# Patient Record
Sex: MALE | Race: WHITE | NOT HISPANIC OR LATINO | Employment: OTHER | URBAN - METROPOLITAN AREA
[De-identification: names, ages, dates, MRNs, and addresses within clinical notes are randomized per-mention and may not be internally consistent; named-entity substitution may affect disease eponyms.]

---

## 2018-10-08 NOTE — PROGRESS NOTES
Date of Office Visit: 10/09/2018  Encounter Provider: FRANSISCO Kinsey  Place of Service: [facility] CARDIOLOGY  Patient Name: Sharif Mea  :1945      Chief Complaint   Patient presents with   • Coronary Artery Disease     Follow Up    :     Dear Dr. Donahue,     HPI: Sharif Mae is a pleasant 73 y.o. male who presents today for cardiac follow up. He is a new patient to me and his previous records have been reviewed.  He has a history of hypertension, hyperlipidemia, obstructive sleep apnea (compliant with CPAP machine), previous stroke, and abnormal EKG with right bundle-branch block.    He has a history of coronary artery disease and status post coronary artery bypass graft surgery 2004.  He is an established patient of Dr. Breana Noe.  He had a cardiac PET scan in 2015 which showed no evidence of ischemia and an echocardiogram which revealed an EF of 57%, grade 2 diastolic dysfunction, mild left ventricular hypertrophy, moderate right ventricular dilation, moderate left atrial dilation, and mild tricuspid insufficiency.  He was last seen by Dr. Noe in 2016.  He did not come for his annual follow-up in .    He presents today for follow-up at the request of Dr. Charlotte Donahue.  He is still working part-time as a CPA and remains fairly active playing golf.  He denies chest pain, shortness of air, PND, orthopnea, cough, edema, palpitations, dizziness, or syncope.  His blood pressure is borderline elevated today in the office.  I reviewed his blood work from his PCP office dated 8/15/18 which included a CMP which was normal, CBC normal hemoglobin and hematocrit, LDL 60, HDL 35, triglycerides 199.    Past Medical History:   Diagnosis Date   • CAD (coronary artery disease)     s/p CABG   • H/O echocardiogram    • Health care maintenance    • History of EKG 2015   • Hx of CABG    • Hyperlipidemia    • Hypertension    •  Lumbar canal stenosis    • KLEBER on CPAP    • RBBB (right bundle branch block)    • Spondylisthesis    • Squamous cell carcinoma of skin    • Stroke syndrome        Past Surgical History:   Procedure Laterality Date   • CARDIAC CATHETERIZATION  12/24/2004 12/2004; Showed 90% stenosis in the OM-1, 50% stenosis in the distal left circumflex, 70% stenosis in the Ramus, and 40-50% stenosis in the ditsal RCA.   • CORONARY ARTERY BYPASS GRAFT  12/2004    LIMA to the LAD, KITTY to the second obtuse marginal, saphenous vein graft to the ramus, and saphenous vein graft to the first obtuse marginal    • KNEE SURGERY Right 09/14/2014       Social History     Social History   • Marital status:      Spouse name: N/A   • Number of children: N/A   • Years of education: N/A     Occupational History   • Not on file.     Social History Main Topics   • Smoking status: Former Smoker     Types: Pipe   • Smokeless tobacco: Not on file      Comment: quit 20 years ago  //  caffeine use   • Alcohol use Yes      Comment: caffeine use   • Drug use: Unknown   • Sexual activity: Not on file       Family History   Problem Relation Age of Onset   • Heart disease Mother    • Heart disease Father    • Hypertension Father    • Stroke Father        The following portion of the patient's history were reviewed and updated as appropriate: past medical history, past surgical history, past social history, past family history, allergies, current medications, and problem list.    Review of Systems   Constitution: Negative for chills, diaphoresis, fever, malaise/fatigue, night sweats, weight gain and weight loss.   HENT: Negative for hearing loss, nosebleeds, sore throat and tinnitus.    Eyes: Negative for blurred vision, double vision, pain and visual disturbance.   Cardiovascular: Negative for chest pain, claudication, cyanosis, dyspnea on exertion, irregular heartbeat, leg swelling, near-syncope, orthopnea, palpitations, paroxysmal nocturnal  "dyspnea and syncope.   Respiratory: Negative for cough, hemoptysis, shortness of breath, snoring and wheezing.    Endocrine: Negative for cold intolerance, heat intolerance and polyuria.   Hematologic/Lymphatic: Negative for bleeding problem. Does not bruise/bleed easily.   Skin: Negative for color change, dry skin, flushing and itching.   Musculoskeletal: Negative for falls, joint pain, joint swelling, muscle cramps, muscle weakness and myalgias.   Gastrointestinal: Negative for abdominal pain, constipation, heartburn, melena, nausea and vomiting.   Genitourinary: Negative for dysuria and hematuria.   Neurological: Negative for excessive daytime sleepiness, dizziness, light-headedness, loss of balance, numbness, paresthesias, seizures and vertigo.   Psychiatric/Behavioral: Negative for altered mental status, depression, memory loss and substance abuse. The patient does not have insomnia and is not nervous/anxious.    Allergic/Immunologic: Negative for environmental allergies.       No Known Allergies      Current Outpatient Prescriptions:   •  amLODIPine (NORVASC) 5 MG tablet, Take 1 tablet by mouth Daily. For blood pressure, Disp: , Rfl:   •  aspirin 81 MG tablet, Take 1 tablet by mouth Daily., Disp: 30 tablet, Rfl: 11  •  CIALIS 5 MG tablet, Take 1 tablet by mouth Daily As Needed., Disp: , Rfl: 1  •  Glucosamine HCl (GLUCOSAMINE PO), Take  by mouth., Disp: , Rfl:   •  rosuvastatin (CRESTOR) 10 MG tablet, Take 1 tablet by mouth Every Night. At bedtime, Disp: , Rfl:   •  Testosterone (AXIRON) 30 MG/ACT solution, Place  on the skin., Disp: , Rfl:   •  Multiple Vitamin tablet, Take  by mouth., Disp: , Rfl:   •  Vitamin E 400 UNITS tablet, Take  by mouth., Disp: , Rfl:         Objective:     Vitals:    10/09/18 1101   BP: 140/80   Pulse: 73   Weight: 107 kg (235 lb 3.2 oz)   Height: 167.6 cm (66\")     Body mass index is 37.96 kg/m².    PHYSICAL EXAM:    Vitals Reviewed.   General Appearance: No acute distress, well " developed and well nourished. Obese.   Eyes: Conjunctiva and lids: No erythema, swelling, or discharge. Sclera non-icteric.   HENT: Atraumatic, normocephalic. External eyes, ears, and nose normal. No hearing loss noted. Mucous membranes normal. Lips not cyanotic. Neck supple with no tenderness.  Respiratory: No signs of respiratory distress. Respiration rhythm and depth normal.   Clear to auscultation. No rales, crackles, rhonchi, or wheezing auscultated.   Cardiovascular:  Jugular Venous Pressure: Normal  Heart Rate and Rhythm: Normal, Heart Sounds: Normal S1 and S2. No S3 or S4 noted.  Murmurs: No murmurs noted. No rubs, thrills, or gallops.   Arterial Pulses: Carotid pulses normal. No carotid bruit noted. Posterior tibialis and dorsalis pedis pulses normal.   Lower Extremities: No edema noted.  Gastrointestinal:  Abdomen soft, non-distended, non-tender. Normal bowel sounds. No hepatomegaly.   Musculoskeletal: Normal movement of extremities  Skin and Nails: General appearance normal. No pallor, cyanosis, diaphoresis. Skin temperature normal. No clubbing of fingernails.   Psychiatric: Patient alert and oriented to person, place, and time. Speech and behavior appropriate. Normal mood and affect.       ECG 12 Lead  Date/Time: 10/9/2018 10:57 AM  Performed by: BARBARA DUMONT  Authorized by: BARBARA DUMONT   Comparison: compared with previous ECG from 12/5/2016  Rhythm: sinus rhythm  Rate: normal  BPM: 73  Conduction: right bundle branch block  ST Segments: ST segments normal  T Waves: T waves normal  QRS axis: right  Other findings: PRWP  Q waves: V1 and V2  Clinical impression: abnormal ECG              Assessment:       Diagnosis Plan   1. Coronary artery disease involving native coronary artery of native heart without angina pectoris     2. Essential hypertension     3. Hyperlipidemia, unspecified hyperlipidemia type     4. RBBB (right bundle branch block)            Plan:       1.  Coronary Artery Disease: He  denies anginal symptoms and EKG tracing is unchanged.  We discussed risk factor modification including good blood pressure control, cholesterol, and regular exercise.  He remains on good medications for secondary prevention including aspirin, amlodipine, and rosuvastatin.  Last cardiac patent stress test in 2015 showed no evidence of ischemia.    Coronary Artery Disease  Assessment  • The patient has no angina    Plan  • Lifestyle modifications discussed include adhering to a heart healthy diet, maintenance of a healthy weight, medication compliance, regular exercise and regular monitoring of cholesterol and blood pressure    Subjective - Objective  • There is a history of previous coronary artery bypass graft  • Current antiplatelet therapy includes aspirin 81 mg      2.  Hypertension: Blood pressure is borderline elevated today.  He will need to continue to monitor.    3.  Hyperlipidemia: LDL within normal limits, continue rosuvastatin.    4.  Right Bundle Branch Block: Chronic and unchanged.    5.  Obesity: BMI is 38.  He would benefit from weight loss and regular exercise.    6.  Follow-up with Dr. Breana Noe in one year, unless otherwise needed sooner.    As always, it has been a pleasure to participate in your patient's care. Thank you.       Sincerely,         FRANSISCO Garcia

## 2018-10-09 ENCOUNTER — OFFICE VISIT (OUTPATIENT)
Dept: CARDIOLOGY | Facility: CLINIC | Age: 73
End: 2018-10-09

## 2018-10-09 VITALS
DIASTOLIC BLOOD PRESSURE: 80 MMHG | WEIGHT: 235.2 LBS | SYSTOLIC BLOOD PRESSURE: 140 MMHG | HEIGHT: 66 IN | BODY MASS INDEX: 37.8 KG/M2 | HEART RATE: 73 BPM

## 2018-10-09 DIAGNOSIS — E78.5 HYPERLIPIDEMIA, UNSPECIFIED HYPERLIPIDEMIA TYPE: ICD-10-CM

## 2018-10-09 DIAGNOSIS — I25.10 CORONARY ARTERY DISEASE INVOLVING NATIVE CORONARY ARTERY OF NATIVE HEART WITHOUT ANGINA PECTORIS: Primary | ICD-10-CM

## 2018-10-09 DIAGNOSIS — I45.10 RBBB (RIGHT BUNDLE BRANCH BLOCK): ICD-10-CM

## 2018-10-09 DIAGNOSIS — I10 ESSENTIAL HYPERTENSION: ICD-10-CM

## 2018-10-09 PROCEDURE — 93000 ELECTROCARDIOGRAM COMPLETE: CPT | Performed by: NURSE PRACTITIONER

## 2018-10-09 PROCEDURE — 99214 OFFICE O/P EST MOD 30 MIN: CPT | Performed by: NURSE PRACTITIONER

## 2018-10-23 ENCOUNTER — APPOINTMENT (OUTPATIENT)
Dept: SLEEP MEDICINE | Facility: HOSPITAL | Age: 73
End: 2018-10-23
Attending: INTERNAL MEDICINE

## 2019-02-28 ENCOUNTER — AMBULATORY SURGICAL CENTER (OUTPATIENT)
Dept: URBAN - METROPOLITAN AREA SURGERY 20 | Facility: SURGERY | Age: 74
End: 2019-02-28
Payer: COMMERCIAL

## 2019-02-28 ENCOUNTER — OFFICE (OUTPATIENT)
Dept: URBAN - METROPOLITAN AREA PATHOLOGY 4 | Facility: PATHOLOGY | Age: 74
End: 2019-02-28
Payer: COMMERCIAL

## 2019-02-28 DIAGNOSIS — K64.1 SECOND DEGREE HEMORRHOIDS: ICD-10-CM

## 2019-02-28 DIAGNOSIS — Z80.0 FAMILY HISTORY OF MALIGNANT NEOPLASM OF DIGESTIVE ORGANS: ICD-10-CM

## 2019-02-28 DIAGNOSIS — K57.30 DIVERTICULOSIS OF LARGE INTESTINE WITHOUT PERFORATION OR ABS: ICD-10-CM

## 2019-02-28 DIAGNOSIS — D12.3 BENIGN NEOPLASM OF TRANSVERSE COLON: ICD-10-CM

## 2019-02-28 PROBLEM — Z80.9 FAMILY HISTORY OF COLON CANCER: Status: ACTIVE | Noted: 2019-02-28

## 2019-02-28 LAB
GI HISTOLOGY: A. SELECT: (no result)
GI HISTOLOGY: PDF REPORT: (no result)

## 2019-02-28 PROCEDURE — 45380 COLONOSCOPY AND BIOPSY: CPT | Mod: PT | Performed by: INTERNAL MEDICINE

## 2019-02-28 PROCEDURE — 88305 TISSUE EXAM BY PATHOLOGIST: CPT | Mod: 33 | Performed by: INTERNAL MEDICINE

## 2019-06-17 ENCOUNTER — OFFICE VISIT (OUTPATIENT)
Dept: SLEEP MEDICINE | Facility: HOSPITAL | Age: 74
End: 2019-06-17

## 2019-06-17 VITALS
DIASTOLIC BLOOD PRESSURE: 67 MMHG | BODY MASS INDEX: 39.41 KG/M2 | HEART RATE: 60 BPM | OXYGEN SATURATION: 95 % | WEIGHT: 245.2 LBS | SYSTOLIC BLOOD PRESSURE: 125 MMHG | HEIGHT: 66 IN

## 2019-06-17 DIAGNOSIS — G47.33 OSA TREATED WITH BIPAP: Primary | ICD-10-CM

## 2019-06-17 DIAGNOSIS — E66.9 OBESITY (BMI 30-39.9): ICD-10-CM

## 2019-06-17 DIAGNOSIS — G47.31 COMPLEX SLEEP APNEA SYNDROME: ICD-10-CM

## 2019-06-17 PROCEDURE — G0463 HOSPITAL OUTPT CLINIC VISIT: HCPCS

## 2019-06-17 RX ORDER — ATORVASTATIN CALCIUM 40 MG/1
40 TABLET, FILM COATED ORAL DAILY
COMMUNITY
End: 2020-02-18

## 2019-06-17 NOTE — PROGRESS NOTES
[facility]  Telephone: [phone] / Fax: [phone] [place]  Telephone: [phone] / Fax: [phone] Mery Bhatt    Referring Physician: Charlotte Donahue MD  PCP: Charlotte Donahue MD    Reason for consult:  sleep apnea    Sharif Mae is a 73 y.o.male  was seen in the [facility] today for re-evaluation of sleep apnea. He was diagnosed with KLEBER at least 7 years ago. He is now on his second machine which he got from [email or web address].  He does not recall where was his original sleep study done but [facility] may have a copy of it. He is here today per request of Dr. Cardona to make sure his KLEBER is controlled well.  He is not on a modem. He did not bring his smart card for review.    According to his wife he does not snore on the machine and has no witnessed apneas or gasping respirations. He feels that his level of alertness during the day has improved with CPAP device and so is the sleep quality. He wakes up feeling more rested in the morning.  His sleep schedule is 11pm-8am.    SH- retired, 4 oz of alcohol, 3 coffee per day, no drugs.    ROS- negative    Sharif Mae  has a past medical history of CAD (coronary artery disease), H/O echocardiogram, Health care maintenance, History of EKG (07/21/2015), CABG, Hyperlipidemia, Hypertension, Lumbar canal stenosis, KLEBER on CPAP, RBBB (right bundle branch block), Spondylisthesis, Squamous cell carcinoma of skin, and Stroke syndrome.    Current Medications:    Current Outpatient Medications:   •  amLODIPine (NORVASC) 5 MG tablet, Take 1 tablet by mouth Daily. For blood pressure, Disp: , Rfl:   •  atorvastatin (LIPITOR) 40 MG tablet, Take 40 mg by mouth Daily., Disp: , Rfl:   •  CIALIS 5 MG tablet, Take 1 tablet by mouth Daily As Needed., Disp: , Rfl: 1  •  Glucosamine HCl (GLUCOSAMINE PO), Take  by mouth., Disp: , Rfl:   •  L-Methylfolate-R15-K6-K2 (CEREFOLIN PO), Take  by mouth., Disp: , Rfl:   •  Memantine HCl-Donepezil HCl  "(NAMZARIC) 14-10 MG capsule sustained-release 24 hr, Take  by mouth., Disp: , Rfl:   •  Multiple Vitamin tablet, Take  by mouth., Disp: , Rfl:   •  rosuvastatin (CRESTOR) 10 MG tablet, Take 1 tablet by mouth Every Night. At bedtime, Disp: , Rfl:   •  Testosterone (AXIRON) 30 MG/ACT solution, Place  on the skin., Disp: , Rfl:   •  aspirin 81 MG tablet, Take 1 tablet by mouth Daily., Disp: 30 tablet, Rfl: 11  •  Vitamin E 400 UNITS tablet, Take  by mouth., Disp: , Rfl:     I have reviewed Past Medical History, Past Surgical History, Medication List, Social History and Family History as entered in Sleep Questionnaire and EPIC.    ESS  0   Vital Signs /67 (BP Location: Left arm, Patient Position: Sitting)   Pulse 60   Ht 167.6 cm (66\")   Wt 111 kg (245 lb 3.2 oz)   SpO2 95%   BMI 39.58 kg/m²  Body mass index is 39.58 kg/m².    General Alert and oriented. No acute distress noted   Pharynx/Throat Class IV Mallampati airway, large tongue, no evidence of redundant lateral pharyngeal tissue. No oral lesions. No thrush. Moist mucous membranes.   Head Normocephalic. Symmetrical. Atraumatic.    Nose No septal deviation. No drainage   Chest Wall Normal shape. Symmetric expansion with respiration. No tenderness.   Neck Trachea midline, no thyromegaly or adenopathy    Lungs Clear to auscultation bilaterally. No wheezes. No rhonchi. No rales. Respirations regular, even and unlabored.   Heart Regular rhythm and normal rate. Normal S1 and S2. No murmur   Abdomen Soft, non-tender and non-distended. Normal bowel sounds. No masses.   Extremities Moves all extremities well. No edema   Psychiatric Normal mood and affect.       Testing: sleep study report is unavailable.              Impression:  1. KLEBER treated with BiPAP    2. Complex sleep apnea syndrome    3. Obesity (BMI 30-39.9)          Plan:  Get copy of sleep study from [employer] [employer]. I asked patient to bring his smart card here for review. If any changes need to be made, I " will make them after reviewing his smart card.  If he still has apneas despite changes in settings, consider titration study in the future.    I discussed the pathophysiology of obstructive sleep apnea with the patient.  We discussed the adverse outcomes associated with untreated sleep-disordered breathing.  .  Weight loss will be strongly beneficial in order to reduce the severity of sleep-disordered breathing.  Patient has narrow oropharyngeal structure.  Caution during activities that require prolonged concentration is strongly advised. Thank you for allowing me to participate in your patient's  care.      I carefully reviewed download dates 5/22/19-6/20/19- 100% use is noted with average nightly use of 8 hours and 32 min. Avg EPAP is 8.8, avg PS 6.7, avg AHI is 3.6.  His current settings are maximum pressure of 25, minimum EPAP of 8, maximum EPAP 15, minimum pressure support 4, maximum pressure support 17.  His complex sleep apnea is very well treated.  He is residual AHI is within excellent range.  There appears to be a large air leak present which requires replacement of mask cushion.  Patient will be notified and will be asked to contact durable medical equipment company to replace.  His residual AHI is not affected by the leak, which is reassuring.    I received copy of his original sleep study from [facility] and reviewed. 2014 PSG showed overall AHI 76(20 episodes of mixed sleep apnea, 6 episodes of central sleep apnea, 46 episodes of obstructive sleep apnea). Titration in 2015 recommended IPAP max 25, max EPAP 15, min EPAP 8, PS 4-17, automatic rate.      RTC in 1 year to see Dr. Suarez.    FRANSISCO Abdalla  [facility]  Phone: [phone]      Part of this note may be an electronic transcription/translation of spoken language to printed text using the Dragon Dictation System. Some errors may exist even though the document was edited.

## 2019-06-24 ENCOUNTER — TELEPHONE (OUTPATIENT)
Dept: SLEEP MEDICINE | Facility: HOSPITAL | Age: 74
End: 2019-06-24

## 2019-06-24 NOTE — TELEPHONE ENCOUNTER
----- Message from Neri Franks sent at 6/24/2019 12:31 PM EDT -----  Lm for pt and called for copy of study. Study received.  ----- Message -----  From: Shannon Kerns APRN  Sent: 6/24/2019   9:50 AM  To: Neri Franks please let patient know I reviewed his smart card data he brought over. His sleep apnea is very well controlled at this time which is excellent. His mask started leaking. The cushion needs to be replaced monthly to prevent it. If he has any issues, ask him to call us. Otherwise, ask him to f/u with Dr. Suarez here next year as scheduled.    Please ask [facility] to send us his sleep study report.    Thanks.

## 2020-02-18 ENCOUNTER — TELEPHONE (OUTPATIENT)
Dept: CARDIOLOGY | Facility: CLINIC | Age: 75
End: 2020-02-18

## 2020-02-18 ENCOUNTER — OFFICE VISIT (OUTPATIENT)
Dept: CARDIOLOGY | Facility: CLINIC | Age: 75
End: 2020-02-18

## 2020-02-18 VITALS
SYSTOLIC BLOOD PRESSURE: 132 MMHG | WEIGHT: 260.6 LBS | HEIGHT: 66 IN | DIASTOLIC BLOOD PRESSURE: 66 MMHG | BODY MASS INDEX: 41.88 KG/M2 | HEART RATE: 65 BPM

## 2020-02-18 DIAGNOSIS — I10 ESSENTIAL HYPERTENSION: ICD-10-CM

## 2020-02-18 DIAGNOSIS — I45.10 RBBB (RIGHT BUNDLE BRANCH BLOCK): ICD-10-CM

## 2020-02-18 DIAGNOSIS — E78.5 HYPERLIPIDEMIA, UNSPECIFIED HYPERLIPIDEMIA TYPE: ICD-10-CM

## 2020-02-18 DIAGNOSIS — I25.10 CORONARY ARTERY DISEASE INVOLVING NATIVE CORONARY ARTERY OF NATIVE HEART WITHOUT ANGINA PECTORIS: Primary | ICD-10-CM

## 2020-02-18 PROCEDURE — 93000 ELECTROCARDIOGRAM COMPLETE: CPT | Performed by: INTERNAL MEDICINE

## 2020-02-18 PROCEDURE — 99214 OFFICE O/P EST MOD 30 MIN: CPT | Performed by: INTERNAL MEDICINE

## 2020-02-18 RX ORDER — ATORVASTATIN CALCIUM 80 MG/1
80 TABLET, FILM COATED ORAL NIGHTLY
Qty: 90 TABLET | Refills: 3 | Status: SHIPPED | COMMUNITY
Start: 2020-02-18 | End: 2020-02-24 | Stop reason: SDUPTHER

## 2020-02-18 NOTE — PROGRESS NOTES
Date of Office Visit: 2020  Encounter Provider: Breana Noe MD  Place of Service: [facility] CARDIOLOGY  Patient Name: Sharif Mae  :1945      Patient ID:  Sharif Mae is a 74 y.o. male is here for  followup for CAD, s/p CABG.         History of Present Illness    In , he had an abnormal stress test, which was followed up with a cardiac  catheterization. This revealed multivessel coronary artery disease. He underwent coronary  artery bypass grafting in 2004 with a LIMA to the LAD, KITTY to the second obtuse  marginal, saphenous vein graft to the ramus, and saphenous vein graft to the first obtuse  marginal. His ejection fraction was normal at that time.      He saw me in 2013 and was noted to have an abnormal EKG with anterior Q  waves. We set him up for an echocardiogram, which revealed normal left ventricular size  and systolic function with normal wall motion. He then had a plain treadmill stress test.  He was able to exercise for 9 minutes of a Napoleon protocol, reaching a peak heart rate of  86% of his age predicted maximum. There were no EKG changes, and there was no chest  discomfort, although he did become quite wheezy and hypoxic with an oxygen saturation of  86%.      He was diagnosed severe obstructive  sleep apnea in 2015 and now he uses a CPAP machine and does feel better with that.      When I saw him in  he was very sedentary because he had had a lot of back issues and was getting epidural injections for his back.  He was also having some dyspnea on exertion which he thought might be due to deconditioning but given his history, I did send him for a 2-D echocardiogram with Doppler which was performed on 2015 which revealed an ejection fraction of 57% with grade II diastolic dysfunction, moderate right ventricular dilation, moderate left atrial dilation, mild tricuspid insufficiency, and otherwise normal valvular  structure and function. There was mild left ventricular hypertrophy.  He also had a stress PET study performed on 08/03/2015 which showed no evidence of ischemia.       He has no chest pain or pressure.  He has no orthopnea or PND.  He has no exertional dyspnea.  He has had no palpitations, dizziness or syncope.  He is not exercising.  He needs to be exercising.  He has no difficulty with his medications.  He did have labs done with Dr. Donahue.  His energy level is somewhat low at times but he thinks is because the weather not exercising.    Labs on 5/29/2019 show glucose 108, otherwise normal CMP, normal CBC, LDL not done because triglycerides 482.  HDL was 40.  Hemoglobin A1c 5.3, normal TSH.    Past Medical History:   Diagnosis Date   • CAD (coronary artery disease)     s/p CABG   • H/O echocardiogram    • Health care maintenance    • History of EKG 07/21/2015   • Hx of CABG    • Hyperlipidemia    • Hypertension    • Lumbar canal stenosis    • KLEBER on CPAP    • RBBB (right bundle branch block)    • Spondylisthesis    • Squamous cell carcinoma of skin    • Stroke syndrome          Past Surgical History:   Procedure Laterality Date   • CARDIAC CATHETERIZATION  12/24/2004 12/2004; Showed 90% stenosis in the OM-1, 50% stenosis in the distal left circumflex, 70% stenosis in the Ramus, and 40-50% stenosis in the ditsal RCA.   • CORONARY ARTERY BYPASS GRAFT  12/2004    LIMA to the LAD, KITTY to the second obtuse marginal, saphenous vein graft to the ramus, and saphenous vein graft to the first obtuse marginal    • KNEE SURGERY Right 09/14/2014       Current Outpatient Medications on File Prior to Visit   Medication Sig Dispense Refill   • amLODIPine (NORVASC) 5 MG tablet Take 1 tablet by mouth Daily. For blood pressure     • atorvastatin (LIPITOR) 40 MG tablet Take 40 mg by mouth Daily.     • CIALIS 5 MG tablet Take 1 tablet by mouth Daily As Needed.  1   • Glucosamine HCl (GLUCOSAMINE PO) Take  by mouth.     •  L-Methylfolate-X96-[id number] (CEREFOLIN PO) Take  by mouth.     • Memantine HCl-Donepezil HCl (NAMZARIC) 14-10 MG capsule sustained-release 24 hr Take  by mouth Daily.     • Testosterone (AXIRON) 30 MG/ACT solution Place  on the skin.     • Vitamin E 400 UNITS tablet Take  by mouth.     • [DISCONTINUED] aspirin 81 MG tablet Take 1 tablet by mouth Daily. 30 tablet 11   • [DISCONTINUED] Multiple Vitamin tablet Take  by mouth.     • [DISCONTINUED] rosuvastatin (CRESTOR) 10 MG tablet Take 1 tablet by mouth Every Night. At bedtime       No current facility-administered medications on file prior to visit.        Social History     Socioeconomic History   • Marital status:      Spouse name: Not on file   • Number of children: Not on file   • Years of education: Not on file   • Highest education level: Not on file   Tobacco Use   • Smoking status: Former Smoker     Types: Pipe     Last attempt to quit: 1987     Years since quittin.0   • Smokeless tobacco: Never Used   Substance and Sexual Activity   • Alcohol use: Yes     Alcohol/week: 2.0 standard drinks     Types: 2 Shots of liquor per week     Drinks per session: 1 or 2     Binge frequency: Daily or almost daily     Comment: caffeine use: 3  cups daily.            Review of Systems   Constitution: Negative.   HENT: Negative for congestion.    Eyes: Negative for vision loss in left eye and vision loss in right eye.   Respiratory: Negative.  Negative for cough, hemoptysis, shortness of breath, sleep disturbances due to breathing, snoring, sputum production and wheezing.    Endocrine: Negative.    Hematologic/Lymphatic: Negative.    Skin: Negative for poor wound healing and rash.   Musculoskeletal: Negative for falls, gout, muscle cramps and myalgias.   Gastrointestinal: Negative for abdominal pain, diarrhea, dysphagia, hematemesis, melena, nausea and vomiting.   Neurological: Negative for excessive daytime sleepiness, dizziness, headaches, light-headedness,  "loss of balance, seizures and vertigo.   Psychiatric/Behavioral: Negative for depression and substance abuse. The patient is not nervous/anxious.        Procedures    ECG 12 Lead  Date/Time: 2/18/2020 11:58 AM  Performed by: Breana Noe MD  Authorized by: Breana Noe MD   Comparison: compared with previous ECG   Similar to previous ECG  Rhythm: sinus rhythm  Conduction: incomplete right bundle branch block    Clinical impression: non-specific ECG                Objective:      Vitals:    02/18/20 1141   BP: 132/66   BP Location: Left arm   Pulse: 65   Weight: 118 kg (260 lb 9.6 oz)   Height: 167.6 cm (66\")     Body mass index is 42.06 kg/m².    Physical Exam   Constitutional: He is oriented to person, place, and time. He appears well-developed and well-nourished. No distress.   HENT:   Head: Normocephalic and atraumatic.   Eyes: Conjunctivae are normal. No scleral icterus.   Neck: Neck supple. No JVD present. Carotid bruit is not present. No thyromegaly present.   Cardiovascular: Normal rate, regular rhythm, S1 normal, S2 normal and intact distal pulses.  No extrasystoles are present. PMI is not displaced. Exam reveals no gallop.   No murmur heard.  Pulses:       Carotid pulses are 2+ on the right side, and 2+ on the left side.       Radial pulses are 2+ on the right side, and 2+ on the left side.        Dorsalis pedis pulses are 2+ on the right side, and 2+ on the left side.        Posterior tibial pulses are 2+ on the right side, and 2+ on the left side.   Pulmonary/Chest: Effort normal and breath sounds normal. No respiratory distress. He has no wheezes. He has no rhonchi. He has no rales. He exhibits no tenderness.   Abdominal: Soft. Bowel sounds are normal. He exhibits no distension, no abdominal bruit and no mass. There is no tenderness.   Musculoskeletal: He exhibits no edema or deformity.   Lymphadenopathy:     He has no cervical adenopathy.   Neurological: He is alert and oriented to " person, place, and time. No cranial nerve deficit.   Skin: Skin is warm and dry. No rash noted. He is not diaphoretic. No cyanosis. No pallor. Nails show no clubbing.   Psychiatric: He has a normal mood and affect. Judgment normal.   Vitals reviewed.      Lab Review:       Assessment:      Diagnosis Plan   1. Coronary artery disease involving native coronary artery of native heart without angina pectoris     2. Hyperlipidemia, unspecified hyperlipidemia type     3. Essential hypertension     4. RBBB (right bundle branch block)       1. CAD, s/p CABG 2004, Normal PET stress study and a 2-D echocardiogram with Doppler with EF 57% and grade 2 diastolic dysfunction in 2015.    2. Obstructive sleep apnea. Treated with CPAP.   3. Hypertension, well controlled. Goal <120/80.    4. Hyperlipidemia, on atorvastatin.   5. History of stroke after taking Viagra.   6. Family history of cardiovascular disease.   7. Morbid obesity.   8. Sedentary lifestyle. needs ti exercise.   9. Chronic back pain, receiving epidural therapy.          Plan:       See damian in 1 year, get labs from Dr. Donahue.

## 2020-02-19 ENCOUNTER — TELEPHONE (OUTPATIENT)
Dept: CARDIOLOGY | Facility: CLINIC | Age: 75
End: 2020-02-19

## 2020-02-19 NOTE — TELEPHONE ENCOUNTER
----- Message from Breana Noe MD sent at 2/18/2020 12:37 PM EST -----    pls let him know that I received his labs and his cholesterol is too high, increase atorvastatin to 80mg nightly, I sent to his pharmacy.     rm

## 2020-02-24 RX ORDER — ATORVASTATIN CALCIUM 80 MG/1
80 TABLET, FILM COATED ORAL NIGHTLY
Qty: 90 TABLET | Refills: 1 | Status: ON HOLD | OUTPATIENT
Start: 2020-02-24 | End: 2023-03-14

## 2020-06-12 ENCOUNTER — APPOINTMENT (OUTPATIENT)
Dept: SLEEP MEDICINE | Facility: HOSPITAL | Age: 75
End: 2020-06-12

## 2021-02-18 ENCOUNTER — TELEPHONE (OUTPATIENT)
Dept: CARDIOLOGY | Facility: CLINIC | Age: 76
End: 2021-02-18

## 2021-03-15 ENCOUNTER — BULK ORDERING (OUTPATIENT)
Dept: CASE MANAGEMENT | Facility: OTHER | Age: 76
End: 2021-03-15

## 2021-03-15 DIAGNOSIS — Z23 IMMUNIZATION DUE: ICD-10-CM

## 2021-08-10 ENCOUNTER — OFFICE VISIT (OUTPATIENT)
Dept: CARDIOLOGY | Facility: CLINIC | Age: 76
End: 2021-08-10

## 2021-08-10 ENCOUNTER — LAB (OUTPATIENT)
Dept: LAB | Facility: HOSPITAL | Age: 76
End: 2021-08-10

## 2021-08-10 VITALS
HEIGHT: 66 IN | RESPIRATION RATE: 16 BRPM | HEART RATE: 66 BPM | WEIGHT: 246 LBS | OXYGEN SATURATION: 98 % | SYSTOLIC BLOOD PRESSURE: 124 MMHG | BODY MASS INDEX: 39.53 KG/M2 | DIASTOLIC BLOOD PRESSURE: 80 MMHG

## 2021-08-10 DIAGNOSIS — I25.10 CORONARY ARTERY DISEASE INVOLVING NATIVE CORONARY ARTERY OF NATIVE HEART WITHOUT ANGINA PECTORIS: Primary | ICD-10-CM

## 2021-08-10 DIAGNOSIS — I45.10 RBBB (RIGHT BUNDLE BRANCH BLOCK): ICD-10-CM

## 2021-08-10 DIAGNOSIS — I25.10 CORONARY ARTERY DISEASE INVOLVING NATIVE CORONARY ARTERY OF NATIVE HEART WITHOUT ANGINA PECTORIS: ICD-10-CM

## 2021-08-10 DIAGNOSIS — E78.5 HYPERLIPIDEMIA, UNSPECIFIED HYPERLIPIDEMIA TYPE: ICD-10-CM

## 2021-08-10 DIAGNOSIS — I10 ESSENTIAL HYPERTENSION: ICD-10-CM

## 2021-08-10 LAB
ALBUMIN SERPL-MCNC: 4.3 G/DL (ref 3.5–5.2)
ALBUMIN/GLOB SERPL: 1.4 G/DL
ALP SERPL-CCNC: 90 U/L (ref 39–117)
ALT SERPL W P-5'-P-CCNC: 37 U/L (ref 1–41)
ANION GAP SERPL CALCULATED.3IONS-SCNC: 10.9 MMOL/L (ref 5–15)
AST SERPL-CCNC: 25 U/L (ref 1–40)
BASOPHILS # BLD AUTO: 0.03 10*3/MM3 (ref 0–0.2)
BASOPHILS NFR BLD AUTO: 0.4 % (ref 0–1.5)
BILIRUB SERPL-MCNC: 0.3 MG/DL (ref 0–1.2)
BUN SERPL-MCNC: 15 MG/DL (ref 8–23)
BUN/CREAT SERPL: 12.2 (ref 7–25)
CALCIUM SPEC-SCNC: 9.3 MG/DL (ref 8.6–10.5)
CHLORIDE SERPL-SCNC: 107 MMOL/L (ref 98–107)
CHOLEST SERPL-MCNC: 165 MG/DL (ref 0–200)
CO2 SERPL-SCNC: 22.1 MMOL/L (ref 22–29)
CREAT SERPL-MCNC: 1.23 MG/DL (ref 0.76–1.27)
DEPRECATED RDW RBC AUTO: 54.2 FL (ref 37–54)
EOSINOPHIL # BLD AUTO: 0.21 10*3/MM3 (ref 0–0.4)
EOSINOPHIL NFR BLD AUTO: 2.9 % (ref 0.3–6.2)
ERYTHROCYTE [DISTWIDTH] IN BLOOD BY AUTOMATED COUNT: 14.9 % (ref 12.3–15.4)
GFR SERPL CREATININE-BSD FRML MDRD: 57 ML/MIN/1.73
GLOBULIN UR ELPH-MCNC: 3 GM/DL
GLUCOSE SERPL-MCNC: 105 MG/DL (ref 65–99)
HCT VFR BLD AUTO: 43.1 % (ref 37.5–51)
HDLC SERPL-MCNC: 43 MG/DL (ref 40–60)
HGB BLD-MCNC: 14.3 G/DL (ref 13–17.7)
IMM GRANULOCYTES # BLD AUTO: 0.01 10*3/MM3 (ref 0–0.05)
IMM GRANULOCYTES NFR BLD AUTO: 0.1 % (ref 0–0.5)
LDLC SERPL CALC-MCNC: 84 MG/DL (ref 0–100)
LDLC/HDLC SERPL: 1.79 {RATIO}
LYMPHOCYTES # BLD AUTO: 2.44 10*3/MM3 (ref 0.7–3.1)
LYMPHOCYTES NFR BLD AUTO: 34 % (ref 19.6–45.3)
MAGNESIUM SERPL-MCNC: 2.3 MG/DL (ref 1.6–2.4)
MCH RBC QN AUTO: 32.7 PG (ref 26.6–33)
MCHC RBC AUTO-ENTMCNC: 33.2 G/DL (ref 31.5–35.7)
MCV RBC AUTO: 98.6 FL (ref 79–97)
MONOCYTES # BLD AUTO: 0.62 10*3/MM3 (ref 0.1–0.9)
MONOCYTES NFR BLD AUTO: 8.6 % (ref 5–12)
NEUTROPHILS NFR BLD AUTO: 3.87 10*3/MM3 (ref 1.7–7)
NEUTROPHILS NFR BLD AUTO: 54 % (ref 42.7–76)
NRBC BLD AUTO-RTO: 0 /100 WBC (ref 0–0.2)
PLATELET # BLD AUTO: 186 10*3/MM3 (ref 140–450)
PMV BLD AUTO: 10.7 FL (ref 6–12)
POTASSIUM SERPL-SCNC: 4 MMOL/L (ref 3.5–5.2)
PROT SERPL-MCNC: 7.3 G/DL (ref 6–8.5)
RBC # BLD AUTO: 4.37 10*6/MM3 (ref 4.14–5.8)
SODIUM SERPL-SCNC: 140 MMOL/L (ref 136–145)
TRIGL SERPL-MCNC: 225 MG/DL (ref 0–150)
TSH SERPL DL<=0.05 MIU/L-ACNC: 1.22 UIU/ML (ref 0.27–4.2)
VLDLC SERPL-MCNC: 38 MG/DL (ref 5–40)
WBC # BLD AUTO: 7.18 10*3/MM3 (ref 3.4–10.8)

## 2021-08-10 PROCEDURE — 83735 ASSAY OF MAGNESIUM: CPT

## 2021-08-10 PROCEDURE — 93000 ELECTROCARDIOGRAM COMPLETE: CPT | Performed by: NURSE PRACTITIONER

## 2021-08-10 PROCEDURE — 85025 COMPLETE CBC W/AUTO DIFF WBC: CPT

## 2021-08-10 PROCEDURE — 80061 LIPID PANEL: CPT

## 2021-08-10 PROCEDURE — 84443 ASSAY THYROID STIM HORMONE: CPT

## 2021-08-10 PROCEDURE — 36415 COLL VENOUS BLD VENIPUNCTURE: CPT

## 2021-08-10 PROCEDURE — 80053 COMPREHEN METABOLIC PANEL: CPT

## 2021-08-10 PROCEDURE — 99214 OFFICE O/P EST MOD 30 MIN: CPT | Performed by: NURSE PRACTITIONER

## 2021-08-11 ENCOUNTER — TELEPHONE (OUTPATIENT)
Dept: CARDIOLOGY | Facility: CLINIC | Age: 76
End: 2021-08-11

## 2021-08-11 NOTE — TELEPHONE ENCOUNTER
----- Message from FRANSISCO Puga sent at 8/10/2021  4:32 PM EDT -----  Please call patientHis triglycerides are elevated.  He needs to avoid alcohol, sugars, carbohydrates and saturated fats.  Otherwise his labs look good.  We will await the results of his echo and stress test.  ----- Message -----  From: Lab, Background User  Sent: 8/10/2021  12:13 PM EDT  To: FRANSISCO Puga

## 2021-08-17 NOTE — TELEPHONE ENCOUNTER
Attempted to contact patient. Voicemail left requesting a call back for the results. Will continue to try and reach patient.      Emmie Chaves RN  Triage [facility]

## 2021-08-18 NOTE — TELEPHONE ENCOUNTER
Final attempt made to call results to this patient.  Voicemail left and we will send a letter requesting a return call for results.    Emmie Chaves RN  Triage [facility]

## 2021-08-23 NOTE — TELEPHONE ENCOUNTER
Patient notified of results and verbalized understanding    Schedulers please call and set up stress test and echo.  Call wife at [phone] to schedule  Thanks  Mónica Mackey RN  Triage nurse

## 2021-09-03 ENCOUNTER — HOSPITAL ENCOUNTER (OUTPATIENT)
Dept: CARDIOLOGY | Facility: HOSPITAL | Age: 76
End: 2021-09-03

## 2021-09-03 ENCOUNTER — APPOINTMENT (OUTPATIENT)
Dept: CARDIOLOGY | Facility: HOSPITAL | Age: 76
End: 2021-09-03

## 2021-10-28 ENCOUNTER — HOSPITAL ENCOUNTER (OUTPATIENT)
Dept: CARDIOLOGY | Facility: HOSPITAL | Age: 76
Discharge: HOME | End: 2021-10-28

## 2021-10-28 VITALS — WEIGHT: 249.12 LBS | BODY MASS INDEX: 39.1 KG/M2 | HEIGHT: 67 IN

## 2021-10-28 VITALS
HEART RATE: 65 BPM | HEIGHT: 66 IN | OXYGEN SATURATION: 95 % | DIASTOLIC BLOOD PRESSURE: 88 MMHG | WEIGHT: 246 LBS | SYSTOLIC BLOOD PRESSURE: 128 MMHG | BODY MASS INDEX: 39.53 KG/M2

## 2021-10-28 DIAGNOSIS — I25.10 CORONARY ARTERY DISEASE INVOLVING NATIVE CORONARY ARTERY OF NATIVE HEART WITHOUT ANGINA PECTORIS: ICD-10-CM

## 2021-10-28 DIAGNOSIS — I10 ESSENTIAL HYPERTENSION: ICD-10-CM

## 2021-10-28 DIAGNOSIS — I45.10 RBBB (RIGHT BUNDLE BRANCH BLOCK): ICD-10-CM

## 2021-10-28 DIAGNOSIS — E78.5 HYPERLIPIDEMIA, UNSPECIFIED HYPERLIPIDEMIA TYPE: ICD-10-CM

## 2021-10-28 LAB
BH CV NUCLEAR PRIOR STUDY: 3
BH CV REST NUCLEAR ISOTOPE DOSE: 11.2 MCI
BH CV STRESS BP STAGE 1: NORMAL
BH CV STRESS COMMENTS STAGE 1: NORMAL
BH CV STRESS DOSE REGADENOSON STAGE 1: 0.4
BH CV STRESS DURATION MIN STAGE 1: 0
BH CV STRESS DURATION SEC STAGE 1: 10
BH CV STRESS HR STAGE 1: 78
BH CV STRESS NUCLEAR ISOTOPE DOSE: 35.4 MCI
BH CV STRESS PROTOCOL 1: NORMAL
BH CV STRESS RECOVERY BP: NORMAL MMHG
BH CV STRESS RECOVERY HR: 67 BPM
BH CV STRESS STAGE 1: 1
LV EF NUC BP: 66 %
MAXIMAL PREDICTED HEART RATE: 142 BPM
PERCENT MAX PREDICTED HR: 54.93 %
STRESS BASELINE BP: NORMAL MMHG
STRESS BASELINE HR: 65 BPM
STRESS PERCENT HR: 65 %
STRESS POST EXERCISE DUR SEC: 10 SEC
STRESS POST PEAK BP: NORMAL MMHG
STRESS POST PEAK HR: 78 BPM
STRESS TARGET HR: 121 BPM

## 2021-10-28 PROCEDURE — 93018 CV STRESS TEST I&R ONLY: CPT | Performed by: INTERNAL MEDICINE

## 2021-10-28 PROCEDURE — 25010000002 REGADENOSON 0.4 MG/5ML SOLUTION: Performed by: NURSE PRACTITIONER

## 2021-10-28 PROCEDURE — 25010000002 PERFLUTREN (DEFINITY) 8.476 MG IN SODIUM CHLORIDE (PF) 0.9 % 10 ML INJECTION: Performed by: NURSE PRACTITIONER

## 2021-10-28 PROCEDURE — 93306 TTE W/DOPPLER COMPLETE: CPT | Performed by: INTERNAL MEDICINE

## 2021-10-28 PROCEDURE — 93016 CV STRESS TEST SUPVJ ONLY: CPT | Performed by: INTERNAL MEDICINE

## 2021-10-28 PROCEDURE — 78452 HT MUSCLE IMAGE SPECT MULT: CPT

## 2021-10-28 PROCEDURE — 93017 CV STRESS TEST TRACING ONLY: CPT

## 2021-10-28 PROCEDURE — 0 TECHNETIUM TETROFOSMIN KIT: Performed by: NURSE PRACTITIONER

## 2021-10-28 PROCEDURE — 93306 TTE W/DOPPLER COMPLETE: CPT

## 2021-10-28 PROCEDURE — 78452 HT MUSCLE IMAGE SPECT MULT: CPT | Performed by: INTERNAL MEDICINE

## 2021-10-28 PROCEDURE — A9502 TC99M TETROFOSMIN: HCPCS | Performed by: NURSE PRACTITIONER

## 2021-10-28 RX ADMIN — TETROFOSMIN 1 DOSE: 1.38 INJECTION, POWDER, LYOPHILIZED, FOR SOLUTION INTRAVENOUS at 12:51

## 2021-10-28 RX ADMIN — TETROFOSMIN 1 DOSE: 1.38 INJECTION, POWDER, LYOPHILIZED, FOR SOLUTION INTRAVENOUS at 13:50

## 2021-10-28 RX ADMIN — REGADENOSON 0.4 MG: 0.08 INJECTION, SOLUTION INTRAVENOUS at 13:50

## 2021-10-28 RX ADMIN — PERFLUTREN 1.5 ML: 6.52 INJECTION, SUSPENSION INTRAVENOUS at 12:48

## 2021-10-29 LAB
AORTIC ARCH: 2.5 CM
ASCENDING AORTA: 3.6 CM
BH CV ECHO MEAS - ACS: 2 CM
BH CV ECHO MEAS - AO MAX PG (FULL): 4.4 MMHG
BH CV ECHO MEAS - AO MAX PG: 8.1 MMHG
BH CV ECHO MEAS - AO MEAN PG (FULL): 1.9 MMHG
BH CV ECHO MEAS - AO MEAN PG: 4.4 MMHG
BH CV ECHO MEAS - AO ROOT AREA (BSA CORRECTED): 1.8
BH CV ECHO MEAS - AO ROOT AREA: 12.5 CM^2
BH CV ECHO MEAS - AO ROOT DIAM: 4 CM
BH CV ECHO MEAS - AO V2 MAX: 142.7 CM/SEC
BH CV ECHO MEAS - AO V2 MEAN: 98.3 CM/SEC
BH CV ECHO MEAS - AO V2 VTI: 27 CM
BH CV ECHO MEAS - ASC AORTA: 3.6 CM
BH CV ECHO MEAS - AVA(I,A): 2.3 CM^2
BH CV ECHO MEAS - AVA(I,D): 2.3 CM^2
BH CV ECHO MEAS - AVA(V,A): 2 CM^2
BH CV ECHO MEAS - AVA(V,D): 2 CM^2
BH CV ECHO MEAS - BSA(HAYCOCK): 2.3 M^2
BH CV ECHO MEAS - BSA: 2.2 M^2
BH CV ECHO MEAS - BZI_BMI: 39.7 KILOGRAMS/M^2
BH CV ECHO MEAS - BZI_METRIC_HEIGHT: 167.6 CM
BH CV ECHO MEAS - BZI_METRIC_WEIGHT: 111.6 KG
BH CV ECHO MEAS - EDV(MOD-SP2): 78 ML
BH CV ECHO MEAS - EDV(MOD-SP4): 108 ML
BH CV ECHO MEAS - EDV(TEICH): 78.6 ML
BH CV ECHO MEAS - EF(CUBED): 79.9 %
BH CV ECHO MEAS - EF(MOD-BP): 66 %
BH CV ECHO MEAS - EF(MOD-SP2): 66.7 %
BH CV ECHO MEAS - EF(MOD-SP4): 63 %
BH CV ECHO MEAS - EF(TEICH): 72.7 %
BH CV ECHO MEAS - ESV(MOD-SP2): 26 ML
BH CV ECHO MEAS - ESV(MOD-SP4): 40 ML
BH CV ECHO MEAS - ESV(TEICH): 21.5 ML
BH CV ECHO MEAS - FS: 41.4 %
BH CV ECHO MEAS - IVS/LVPW: 1.1
BH CV ECHO MEAS - IVSD: 1.6 CM
BH CV ECHO MEAS - LAT PEAK E' VEL: 12.5 CM/SEC
BH CV ECHO MEAS - LV DIASTOLIC VOL/BSA (35-75): 49.4 ML/M^2
BH CV ECHO MEAS - LV MASS(C)D: 246.1 GRAMS
BH CV ECHO MEAS - LV MASS(C)DI: 112.7 GRAMS/M^2
BH CV ECHO MEAS - LV MAX PG: 3.8 MMHG
BH CV ECHO MEAS - LV MEAN PG: 2.6 MMHG
BH CV ECHO MEAS - LV SYSTOLIC VOL/BSA (12-30): 18.3 ML/M^2
BH CV ECHO MEAS - LV V1 MAX: 96.8 CM/SEC
BH CV ECHO MEAS - LV V1 MEAN: 78.2 CM/SEC
BH CV ECHO MEAS - LV V1 VTI: 21.3 CM
BH CV ECHO MEAS - LVIDD: 4.2 CM
BH CV ECHO MEAS - LVIDS: 2.5 CM
BH CV ECHO MEAS - LVLD AP2: 7.2 CM
BH CV ECHO MEAS - LVLD AP4: 7.7 CM
BH CV ECHO MEAS - LVLS AP2: 6.8 CM
BH CV ECHO MEAS - LVLS AP4: 6.9 CM
BH CV ECHO MEAS - LVOT AREA (M): 2.8 CM^2
BH CV ECHO MEAS - LVOT AREA: 2.9 CM^2
BH CV ECHO MEAS - LVOT DIAM: 1.9 CM
BH CV ECHO MEAS - LVPWD: 1.4 CM
BH CV ECHO MEAS - MED PEAK E' VEL: 8.2 CM/SEC
BH CV ECHO MEAS - MV A DUR: 0.12 SEC
BH CV ECHO MEAS - MV A MAX VEL: 85.7 CM/SEC
BH CV ECHO MEAS - MV DEC SLOPE: 300.3 CM/SEC^2
BH CV ECHO MEAS - MV DEC TIME: 0.29 SEC
BH CV ECHO MEAS - MV E MAX VEL: 50.1 CM/SEC
BH CV ECHO MEAS - MV E/A: 0.59
BH CV ECHO MEAS - MV MAX PG: 1.9 MMHG
BH CV ECHO MEAS - MV MEAN PG: 0.7 MMHG
BH CV ECHO MEAS - MV P1/2T MAX VEL: 72.1 CM/SEC
BH CV ECHO MEAS - MV P1/2T: 70.4 MSEC
BH CV ECHO MEAS - MV V2 MAX: 68.3 CM/SEC
BH CV ECHO MEAS - MV V2 MEAN: 38.1 CM/SEC
BH CV ECHO MEAS - MV V2 VTI: 21.9 CM
BH CV ECHO MEAS - MVA P1/2T LCG: 3 CM^2
BH CV ECHO MEAS - MVA(P1/2T): 3.1 CM^2
BH CV ECHO MEAS - MVA(VTI): 2.8 CM^2
BH CV ECHO MEAS - PA ACC TIME: 0.1 SEC
BH CV ECHO MEAS - PA MAX PG (FULL): 1.8 MMHG
BH CV ECHO MEAS - PA MAX PG: 4.8 MMHG
BH CV ECHO MEAS - PA PR(ACCEL): 36.2 MMHG
BH CV ECHO MEAS - PA V2 MAX: 109.9 CM/SEC
BH CV ECHO MEAS - PULM A REVS DUR: 0.11 SEC
BH CV ECHO MEAS - PULM A REVS VEL: 33.1 CM/SEC
BH CV ECHO MEAS - PULM DIAS VEL: 44.5 CM/SEC
BH CV ECHO MEAS - PULM S/D: 1.5
BH CV ECHO MEAS - PULM SYS VEL: 64.7 CM/SEC
BH CV ECHO MEAS - PVA(V,A): 3.1 CM^2
BH CV ECHO MEAS - PVA(V,D): 3.1 CM^2
BH CV ECHO MEAS - QP/QS: 0.86
BH CV ECHO MEAS - RAP SYSTOLE: 3 MMHG
BH CV ECHO MEAS - RV MAX PG: 3 MMHG
BH CV ECHO MEAS - RV MEAN PG: 1.7 MMHG
BH CV ECHO MEAS - RV V1 MAX: 87 CM/SEC
BH CV ECHO MEAS - RV V1 MEAN: 61.4 CM/SEC
BH CV ECHO MEAS - RV V1 VTI: 13.3 CM
BH CV ECHO MEAS - RVOT AREA: 4 CM^2
BH CV ECHO MEAS - RVOT DIAM: 2.2 CM
BH CV ECHO MEAS - RVSP: 26 MMHG
BH CV ECHO MEAS - SI(AO): 154 ML/M^2
BH CV ECHO MEAS - SI(CUBED): 27.1 ML/M^2
BH CV ECHO MEAS - SI(LVOT): 28.1 ML/M^2
BH CV ECHO MEAS - SI(MOD-SP2): 23.8 ML/M^2
BH CV ECHO MEAS - SI(MOD-SP4): 31.1 ML/M^2
BH CV ECHO MEAS - SI(TEICH): 26.2 ML/M^2
BH CV ECHO MEAS - SUP REN AO DIAM: 2.1 CM
BH CV ECHO MEAS - SV(AO): 336.4 ML
BH CV ECHO MEAS - SV(CUBED): 59.2 ML
BH CV ECHO MEAS - SV(LVOT): 61.4 ML
BH CV ECHO MEAS - SV(MOD-SP2): 52 ML
BH CV ECHO MEAS - SV(MOD-SP4): 68 ML
BH CV ECHO MEAS - SV(RVOT): 52.6 ML
BH CV ECHO MEAS - SV(TEICH): 57.1 ML
BH CV ECHO MEAS - TAPSE (>1.6): 1.6 CM
BH CV ECHO MEAS - TR MAX VEL: 242 CM/SEC
BH CV ECHO MEASUREMENTS AVERAGE E/E' RATIO: 4.84
BH CV XLRA - RV BASE: 4.2 CM
BH CV XLRA - RV LENGTH: 6.8 CM
BH CV XLRA - RV MID: 3.4 CM
BH CV XLRA - TDI S': 9.6 CM/SEC
LEFT ATRIUM VOLUME INDEX: 27 ML/M2
MAXIMAL PREDICTED HEART RATE: 142 BPM
SINUS: 3.9 CM
STJ: 3.4 CM
STRESS TARGET HR: 121 BPM

## 2021-11-01 ENCOUNTER — TELEPHONE (OUTPATIENT)
Dept: CARDIOLOGY | Facility: CLINIC | Age: 76
End: 2021-11-01

## 2021-11-03 ENCOUNTER — TELEPHONE (OUTPATIENT)
Dept: CARDIOLOGY | Facility: CLINIC | Age: 76
End: 2021-11-03

## 2021-11-03 NOTE — TELEPHONE ENCOUNTER
Please send letter to patient to call us in regards to his labs. Have made multiple attempts to reach him. Thanks

## 2021-11-12 NOTE — TELEPHONE ENCOUNTER
Patient wife danyelle calling stating they received the letter we sent and is wanting to speak to Tahira in regards to husbands test results.

## 2021-11-15 NOTE — TELEPHONE ENCOUNTER
I do not see a release of information form signed by Mr. Mae with his wife's name on it.  Can you please check the chart and make sure that I have not missed it please thank you. The last one that I did see was from 2019.

## 2021-11-22 NOTE — TELEPHONE ENCOUNTER
Pts wife called back in regards to pts test results    Pts phone has not been working but can be reached at [phone]

## 2021-11-22 NOTE — TELEPHONE ENCOUNTER
Attempted to call patient at the number provided by his wife [phone].  Patient was not with him, so I was unable to give him results of his testing.  We will try to get in tomorrow.

## 2021-11-23 ENCOUNTER — TELEPHONE (OUTPATIENT)
Dept: CARDIOLOGY | Facility: CLINIC | Age: 76
End: 2021-11-23

## 2021-11-23 NOTE — TELEPHONE ENCOUNTER
After obtaining verbal consent from Mr. Mae on the phone, I discussed the results of his stress test and echocardiogram with his wife Jennifer and himself.  He is undergoing a neurological work-up for low level hydrocephalus and has been having some mentation issues from this.  They are undergoing work-up for the possibility of placing a shunt.  I told him to reach out to us and keep us posted on his progress.  They verbalized understanding.  Given this, I will have him follow-up with Dr. Noe in 6 months instead of waiting a full year.  If symptoms message the schedulers to have him call Jennifer, and will send them a copy of the information release to be filled out and return to us so that we can discuss his information with her in the future over the phone.

## 2022-03-24 ENCOUNTER — TELEPHONE (OUTPATIENT)
Dept: NEUROSURGERY | Facility: CLINIC | Age: 77
End: 2022-03-24

## 2022-03-24 NOTE — TELEPHONE ENCOUNTER
Received call recently from Dr. Rudy Hernandez, psychiatry, requesting appointment with Dr. Pearce to consider  shunt for NPH. He has had extensive w/u with [facility]y including MRI, LP, neuropsych evals. I d/w Dr. Pearce and he is agreeable to seeing patient. Dr. Hernandez returned my call today after he was able to reach the family. He advised them to gather all prior information, imaging for visit. Please contact his wife, Jennifer to arrange appointment.

## 2022-04-11 NOTE — PROGRESS NOTES
"Subjective   Patient ID: Sharif Mae is a 76 y.o. male is here today as a self referral for NPH.     Treatment: lumbar puncture     Today, he complains of shuffling gait and incontinence.     Patient, Provider, and MA are all wearing a mask in our office today.     History of Present Illness    This patient has been having increasing difficulty with ambulation and incontinence as well as short-term memory.  He has been evaluated thoroughly at [facility] including a large-volume tap from which there is PT documentation that he got significant improvement in his walking.  He has had imaging which shows ventricles that are enlarged out of proportion to the subarachnoid spaces.    The following portions of the patient's history were reviewed and updated as appropriate: allergies, current medications, past family history, past medical history, past social history, past surgical history and problem list.    Review of Systems   Eyes: Negative for visual disturbance.   Genitourinary: Positive for frequency and urgency. Negative for difficulty urinating.        Incontinence    Musculoskeletal: Positive for gait problem. Negative for neck pain and neck stiffness.   Neurological: Positive for weakness. Negative for dizziness and headaches.   Psychiatric/Behavioral: Positive for confusion (mild ).       I have reviewed the review of systems as documented by my MA.      Objective     Vitals:    04/12/22 1414   BP: 142/84   Pulse: 60   SpO2: 95%   Weight: 110 kg (243 lb 9.6 oz)   Height: 170.2 cm (67\")     Body mass index is 38.15 kg/m².      Physical Exam  Constitutional:       Appearance: He is well-developed.   HENT:      Head: Normocephalic and atraumatic.   Eyes:      Extraocular Movements: EOM normal.      Conjunctiva/sclera: Conjunctivae normal.      Pupils: Pupils are equal, round, and reactive to light.   Neck:      Vascular: No carotid bruit.   Neurological:      Mental Status: He is alert and oriented to " person, place, and time.      Coordination: Finger-Nose-Finger Test and Heel to Shin Test normal.      Deep Tendon Reflexes:      Reflex Scores:       Tricep reflexes are 2+ on the right side and 2+ on the left side.       Bicep reflexes are 2+ on the right side and 2+ on the left side.       Brachioradialis reflexes are 2+ on the right side and 2+ on the left side.       Patellar reflexes are 2+ on the right side and 2+ on the left side.       Achilles reflexes are 2+ on the right side and 2+ on the left side.  Psychiatric:         Speech: Speech normal.       Neurologic Exam     Mental Status   Oriented to person, place, and time.   Registration of memory: Good recent and remote memory.   Attention: normal. Concentration: normal.   Speech: speech is normal   Level of consciousness: alert  Knowledge: consistent with education.     Cranial Nerves     CN II   Visual fields full to confrontation.   Visual acuity: normal    CN III, IV, VI   Pupils are equal, round, and reactive to light.  Extraocular motions are normal.     CN V   Facial sensation intact.   Right corneal reflex: normal  Left corneal reflex: normal    CN VII   Facial expression full, symmetric.   Right facial weakness: none  Left facial weakness: none    CN VIII   Hearing: intact    CN IX, X   Palate: symmetric    CN XI   Right sternocleidomastoid strength: normal  Left sternocleidomastoid strength: normal    CN XII   Tongue: not atrophic  Tongue deviation: none    Motor Exam   Muscle bulk: normal  Right arm tone: normal  Left arm tone: normal  Right leg tone: normal  Left leg tone: normal    Strength   Strength 5/5 except as noted.     Sensory Exam   Light touch normal.     Gait, Coordination, and Reflexes     Gait  Gait: shuffling    Coordination   Finger to nose coordination: normal  Heel to shin coordination: normal    Reflexes   Right brachioradialis: 2+  Left brachioradialis: 2+  Right biceps: 2+  Left biceps: 2+  Right triceps: 2+  Left triceps:  2+  Right patellar: 2+  Left patellar: 2+  Right achilles: 2+  Left achilles: 2+  Right : 2+  Left : 2+          Assessment/Plan   Independent Review of Radiographic Studies:      I personally reviewed the images from the following studies.    I reviewed his CT and MRI from [facility].  This does show ventricles enlarged out of proportion to the subarachnoid spaces.    Medical Decision Making:      I told the patient and his daughter that he does have normal pressure hydrocephalus.  There was some discussion by the [facility] neurosurgeon that he needed a cognitive evaluation.  I told the patient and his daughter that I do not think he really needs that.  I explained that the shunt would have a decent chance of helping his walking and possibly the incontinence but it would have no effect on the short-term memory.  I explained that there was a risk of infection bleeding CSF leak bleeding into the brain as well as damage to the bowel.  In addition I told him I did not know how long the shunt will help him.  This is a progressive disease and it will probably only help for a short time.  He is morbidly obese and so we will need to get a general surgeon to help us with the abdominal exposure.  We discussed the postoperative hospital and home course.  He does wish to proceed.    Diagnoses and all orders for this visit:    1. Normal pressure hydrocephalus (HCC)      Return for 2-3 week post op.

## 2022-04-12 ENCOUNTER — PREP FOR SURGERY (OUTPATIENT)
Dept: OTHER | Facility: HOSPITAL | Age: 77
End: 2022-04-12

## 2022-04-12 ENCOUNTER — OFFICE VISIT (OUTPATIENT)
Dept: NEUROSURGERY | Facility: CLINIC | Age: 77
End: 2022-04-12

## 2022-04-12 VITALS
HEART RATE: 60 BPM | WEIGHT: 243.6 LBS | OXYGEN SATURATION: 95 % | SYSTOLIC BLOOD PRESSURE: 142 MMHG | HEIGHT: 67 IN | DIASTOLIC BLOOD PRESSURE: 84 MMHG | BODY MASS INDEX: 38.24 KG/M2

## 2022-04-12 DIAGNOSIS — G91.2 NORMAL PRESSURE HYDROCEPHALUS: ICD-10-CM

## 2022-04-12 DIAGNOSIS — G91.2 NORMAL PRESSURE HYDROCEPHALUS: Primary | ICD-10-CM

## 2022-04-12 PROCEDURE — 99204 OFFICE O/P NEW MOD 45 MIN: CPT | Performed by: NEUROLOGICAL SURGERY

## 2022-04-12 RX ORDER — TRAZODONE HYDROCHLORIDE 50 MG/1
50 TABLET ORAL NIGHTLY
COMMUNITY

## 2022-04-12 RX ORDER — CEFAZOLIN SODIUM 2 G/100ML
2 INJECTION, SOLUTION INTRAVENOUS ONCE
Status: CANCELLED | OUTPATIENT
Start: 2022-05-18 | End: 2022-04-12

## 2022-04-18 ENCOUNTER — PREP FOR SURGERY (OUTPATIENT)
Dept: OTHER | Facility: HOSPITAL | Age: 77
End: 2022-04-18

## 2022-04-18 DIAGNOSIS — G91.2 NORMAL PRESSURE HYDROCEPHALUS: Primary | ICD-10-CM

## 2022-05-04 ENCOUNTER — TELEPHONE (OUTPATIENT)
Dept: NEUROSURGERY | Facility: CLINIC | Age: 77
End: 2022-05-04

## 2022-05-16 ENCOUNTER — TELEPHONE (OUTPATIENT)
Dept: NEUROSURGERY | Facility: CLINIC | Age: 77
End: 2022-05-16

## 2022-05-16 NOTE — TELEPHONE ENCOUNTER
Pt is having a  shunt placement on 5/18/22, but has not gotten PAT's or Covid test yet. Never got letter in the mail from us about this testing.  [phone]

## 2022-05-17 ENCOUNTER — PRE-ADMISSION TESTING (OUTPATIENT)
Dept: PREADMISSION TESTING | Facility: HOSPITAL | Age: 77
End: 2022-05-17

## 2022-05-17 VITALS
TEMPERATURE: 97.4 F | BODY MASS INDEX: 37.51 KG/M2 | HEART RATE: 69 BPM | HEIGHT: 67 IN | RESPIRATION RATE: 20 BRPM | SYSTOLIC BLOOD PRESSURE: 153 MMHG | DIASTOLIC BLOOD PRESSURE: 87 MMHG | WEIGHT: 239 LBS | OXYGEN SATURATION: 95 %

## 2022-05-17 LAB
ANION GAP SERPL CALCULATED.3IONS-SCNC: 12.3 MMOL/L (ref 5–15)
BUN SERPL-MCNC: 20 MG/DL (ref 8–23)
BUN/CREAT SERPL: 15.9 (ref 7–25)
CALCIUM SPEC-SCNC: 9.4 MG/DL (ref 8.6–10.5)
CHLORIDE SERPL-SCNC: 105 MMOL/L (ref 98–107)
CO2 SERPL-SCNC: 22.7 MMOL/L (ref 22–29)
CREAT SERPL-MCNC: 1.26 MG/DL (ref 0.76–1.27)
DEPRECATED RDW RBC AUTO: 53.6 FL (ref 37–54)
EGFRCR SERPLBLD CKD-EPI 2021: 58.4 ML/MIN/1.73
ERYTHROCYTE [DISTWIDTH] IN BLOOD BY AUTOMATED COUNT: 14.4 % (ref 12.3–15.4)
GLUCOSE SERPL-MCNC: 109 MG/DL (ref 65–99)
HCT VFR BLD AUTO: 41.6 % (ref 37.5–51)
HGB BLD-MCNC: 14 G/DL (ref 13–17.7)
MCH RBC QN AUTO: 33.7 PG (ref 26.6–33)
MCHC RBC AUTO-ENTMCNC: 33.7 G/DL (ref 31.5–35.7)
MCV RBC AUTO: 100.2 FL (ref 79–97)
PLATELET # BLD AUTO: 157 10*3/MM3 (ref 140–450)
PMV BLD AUTO: 10.8 FL (ref 6–12)
POTASSIUM SERPL-SCNC: 3.7 MMOL/L (ref 3.5–5.2)
QT INTERVAL: 452 MS
RBC # BLD AUTO: 4.15 10*6/MM3 (ref 4.14–5.8)
SARS-COV-2 RNA RESP QL NAA+PROBE: NOT DETECTED
SODIUM SERPL-SCNC: 140 MMOL/L (ref 136–145)
WBC NRBC COR # BLD: 6.16 10*3/MM3 (ref 3.4–10.8)

## 2022-05-17 PROCEDURE — 93010 ELECTROCARDIOGRAM REPORT: CPT | Performed by: INTERNAL MEDICINE

## 2022-05-17 PROCEDURE — 93005 ELECTROCARDIOGRAM TRACING: CPT

## 2022-05-17 PROCEDURE — 85027 COMPLETE CBC AUTOMATED: CPT

## 2022-05-17 PROCEDURE — U0003 INFECTIOUS AGENT DETECTION BY NUCLEIC ACID (DNA OR RNA); SEVERE ACUTE RESPIRATORY SYNDROME CORONAVIRUS 2 (SARS-COV-2) (CORONAVIRUS DISEASE [COVID-19]), AMPLIFIED PROBE TECHNIQUE, MAKING USE OF HIGH THROUGHPUT TECHNOLOGIES AS DESCRIBED BY CMS-2020-01-R: HCPCS

## 2022-05-17 PROCEDURE — C9803 HOPD COVID-19 SPEC COLLECT: HCPCS

## 2022-05-17 PROCEDURE — 36415 COLL VENOUS BLD VENIPUNCTURE: CPT

## 2022-05-17 PROCEDURE — 80048 BASIC METABOLIC PNL TOTAL CA: CPT

## 2022-05-17 RX ORDER — VORTIOXETINE 20 MG/1
20 TABLET, FILM COATED ORAL DAILY
COMMUNITY

## 2022-05-17 RX ORDER — ALFUZOSIN HYDROCHLORIDE 10 MG/1
10 TABLET, EXTENDED RELEASE ORAL
COMMUNITY
Start: 2022-02-14

## 2022-05-18 ENCOUNTER — HOSPITAL ENCOUNTER (INPATIENT)
Facility: HOSPITAL | Age: 77
LOS: 1 days | Discharge: HOME | End: 2022-05-20
Attending: NEUROLOGICAL SURGERY | Admitting: NEUROLOGICAL SURGERY

## 2022-05-18 ENCOUNTER — ANESTHESIA (OUTPATIENT)
Dept: PERIOP | Facility: HOSPITAL | Age: 77
End: 2022-05-18

## 2022-05-18 ENCOUNTER — ANESTHESIA EVENT (OUTPATIENT)
Dept: PERIOP | Facility: HOSPITAL | Age: 77
End: 2022-05-18

## 2022-05-18 DIAGNOSIS — G91.2 NORMAL PRESSURE HYDROCEPHALUS: ICD-10-CM

## 2022-05-18 LAB
APPEARANCE CSF: CLEAR
COLOR CSF: COLORLESS
GLUCOSE CSF-MCNC: 77 MG/DL (ref 40–70)
METHOD: ABNORMAL
NUC CELL # CSF MANUAL: 3 /MM3 (ref 0–5)
PROT CSF-MCNC: 13.4 MG/DL (ref 15–45)
RBC # CSF MANUAL: 15 /MM3 (ref 0–0)
TUBE # CSF: ABNORMAL

## 2022-05-18 PROCEDURE — 25010000002 ONDANSETRON PER 1 MG: Performed by: NURSE ANESTHETIST, CERTIFIED REGISTERED

## 2022-05-18 PROCEDURE — 62223 ESTABLISH BRAIN CAVITY SHUNT: CPT | Performed by: NEUROLOGICAL SURGERY

## 2022-05-18 PROCEDURE — C1729 CATH, DRAINAGE: HCPCS | Performed by: NEUROLOGICAL SURGERY

## 2022-05-18 PROCEDURE — 62223 ESTABLISH BRAIN CAVITY SHUNT: CPT | Performed by: SURGERY

## 2022-05-18 PROCEDURE — G0378 HOSPITAL OBSERVATION PER HR: HCPCS

## 2022-05-18 PROCEDURE — A9270 NON-COVERED ITEM OR SERVICE: HCPCS | Performed by: NEUROLOGICAL SURGERY

## 2022-05-18 PROCEDURE — 25010000002 FENTANYL CITRATE (PF) 50 MCG/ML SOLUTION: Performed by: NURSE ANESTHETIST, CERTIFIED REGISTERED

## 2022-05-18 PROCEDURE — 63710000001 ATORVASTATIN 80 MG TABLET: Performed by: NEUROLOGICAL SURGERY

## 2022-05-18 PROCEDURE — 25010000002 CEFAZOLIN IN DEXTROSE 2-4 GM/100ML-% SOLUTION: Performed by: NEUROLOGICAL SURGERY

## 2022-05-18 PROCEDURE — 62223 ESTABLISH BRAIN CAVITY SHUNT: CPT | Performed by: SPECIALIST/TECHNOLOGIST, OTHER

## 2022-05-18 PROCEDURE — 00164J6 BYPASS CEREBRAL VENTRICLE TO PERITONEAL CAVITY WITH SYNTHETIC SUBSTITUTE, PERCUTANEOUS ENDOSCOPIC APPROACH: ICD-10-PCS | Performed by: NEUROLOGICAL SURGERY

## 2022-05-18 PROCEDURE — 63710000001 AMLODIPINE 5 MG TABLET: Performed by: NEUROLOGICAL SURGERY

## 2022-05-18 PROCEDURE — 63710000001 TRAZODONE 50 MG TABLET: Performed by: NEUROLOGICAL SURGERY

## 2022-05-18 PROCEDURE — 25010000002 VANCOMYCIN 1 G RECONSTITUTED SOLUTION 1 EACH VIAL: Performed by: NEUROLOGICAL SURGERY

## 2022-05-18 PROCEDURE — 84157 ASSAY OF PROTEIN OTHER: CPT | Performed by: NEUROLOGICAL SURGERY

## 2022-05-18 PROCEDURE — 25010000002 PHENYLEPHRINE 10 MG/ML SOLUTION: Performed by: NURSE ANESTHETIST, CERTIFIED REGISTERED

## 2022-05-18 PROCEDURE — 25010000002 SODIUM CHLORIDE 0.9 % WITH KCL 20 MEQ 20-0.9 MEQ/L-% SOLUTION: Performed by: NEUROLOGICAL SURGERY

## 2022-05-18 PROCEDURE — C1713 ANCHOR/SCREW BN/BN,TIS/BN: HCPCS | Performed by: NEUROLOGICAL SURGERY

## 2022-05-18 PROCEDURE — 82945 GLUCOSE OTHER FLUID: CPT | Performed by: NEUROLOGICAL SURGERY

## 2022-05-18 PROCEDURE — 25010000002 DEXAMETHASONE PER 1 MG: Performed by: NURSE ANESTHETIST, CERTIFIED REGISTERED

## 2022-05-18 PROCEDURE — 89050 BODY FLUID CELL COUNT: CPT | Performed by: NEUROLOGICAL SURGERY

## 2022-05-18 PROCEDURE — 25010000002 PROPOFOL 10 MG/ML EMULSION: Performed by: NURSE ANESTHETIST, CERTIFIED REGISTERED

## 2022-05-18 DEVICE — FLOSEAL HEMOSTATIC MATRIX, 5ML
Type: IMPLANTABLE DEVICE | Site: BRAIN | Status: FUNCTIONAL
Brand: FLOSEAL HEMOSTATIC MATRIX

## 2022-05-18 DEVICE — SSC BONE WAX
Type: IMPLANTABLE DEVICE | Site: BRAIN | Status: FUNCTIONAL
Brand: SSC BONE WAX

## 2022-05-18 DEVICE — CATH 41101 CSF-VENT CATH STD BAR IMP: Type: IMPLANTABLE DEVICE | Site: BRAIN | Status: FUNCTIONAL

## 2022-05-18 DEVICE — ASSY 46866 FP-STRATA 2 SHUNT REG
Type: IMPLANTABLE DEVICE | Site: BRAIN | Status: FUNCTIONAL
Brand: STRATA®

## 2022-05-18 RX ORDER — CEFAZOLIN SODIUM 2 G/100ML
2 INJECTION, SOLUTION INTRAVENOUS ONCE
Status: COMPLETED | OUTPATIENT
Start: 2022-05-18 | End: 2022-05-18

## 2022-05-18 RX ORDER — NALOXONE HCL 0.4 MG/ML
0.4 VIAL (ML) INJECTION
Status: DISCONTINUED | OUTPATIENT
Start: 2022-05-18 | End: 2022-05-20 | Stop reason: HOSPADM

## 2022-05-18 RX ORDER — OXYCODONE AND ACETAMINOPHEN 7.5; 325 MG/1; MG/1
1 TABLET ORAL EVERY 4 HOURS PRN
Status: DISCONTINUED | OUTPATIENT
Start: 2022-05-18 | End: 2022-05-18 | Stop reason: HOSPADM

## 2022-05-18 RX ORDER — SODIUM CHLORIDE AND POTASSIUM CHLORIDE 150; 900 MG/100ML; MG/100ML
100 INJECTION, SOLUTION INTRAVENOUS CONTINUOUS
Status: DISCONTINUED | OUTPATIENT
Start: 2022-05-18 | End: 2022-05-19

## 2022-05-18 RX ORDER — FAMOTIDINE 10 MG/ML
20 INJECTION, SOLUTION INTRAVENOUS ONCE
Status: COMPLETED | OUTPATIENT
Start: 2022-05-18 | End: 2022-05-18

## 2022-05-18 RX ORDER — AMLODIPINE BESYLATE 5 MG/1
5 TABLET ORAL DAILY
Status: DISCONTINUED | OUTPATIENT
Start: 2022-05-18 | End: 2022-05-20 | Stop reason: HOSPADM

## 2022-05-18 RX ORDER — SODIUM CHLORIDE 0.9 % (FLUSH) 0.9 %
3 SYRINGE (ML) INJECTION EVERY 12 HOURS SCHEDULED
Status: DISCONTINUED | OUTPATIENT
Start: 2022-05-18 | End: 2022-05-18 | Stop reason: HOSPADM

## 2022-05-18 RX ORDER — DEXAMETHASONE SODIUM PHOSPHATE 10 MG/ML
INJECTION INTRAMUSCULAR; INTRAVENOUS AS NEEDED
Status: DISCONTINUED | OUTPATIENT
Start: 2022-05-18 | End: 2022-05-18 | Stop reason: SURG

## 2022-05-18 RX ORDER — SODIUM CHLORIDE, SODIUM LACTATE, POTASSIUM CHLORIDE, CALCIUM CHLORIDE 600; 310; 30; 20 MG/100ML; MG/100ML; MG/100ML; MG/100ML
9 INJECTION, SOLUTION INTRAVENOUS CONTINUOUS
Status: DISCONTINUED | OUTPATIENT
Start: 2022-05-18 | End: 2022-05-18

## 2022-05-18 RX ORDER — NALOXONE HCL 0.4 MG/ML
0.2 VIAL (ML) INJECTION AS NEEDED
Status: DISCONTINUED | OUTPATIENT
Start: 2022-05-18 | End: 2022-05-18 | Stop reason: HOSPADM

## 2022-05-18 RX ORDER — PHENYLEPHRINE HYDROCHLORIDE 10 MG/ML
INJECTION INTRAVENOUS AS NEEDED
Status: DISCONTINUED | OUTPATIENT
Start: 2022-05-18 | End: 2022-05-18 | Stop reason: SURG

## 2022-05-18 RX ORDER — SODIUM CHLORIDE 0.9 % (FLUSH) 0.9 %
3-10 SYRINGE (ML) INJECTION AS NEEDED
Status: DISCONTINUED | OUTPATIENT
Start: 2022-05-18 | End: 2022-05-18 | Stop reason: HOSPADM

## 2022-05-18 RX ORDER — HYDROCODONE BITARTRATE AND ACETAMINOPHEN 7.5; 325 MG/1; MG/1
1 TABLET ORAL ONCE AS NEEDED
Status: DISCONTINUED | OUTPATIENT
Start: 2022-05-18 | End: 2022-05-18 | Stop reason: HOSPADM

## 2022-05-18 RX ORDER — HYDROCODONE BITARTRATE AND ACETAMINOPHEN 5; 325 MG/1; MG/1
1 TABLET ORAL EVERY 4 HOURS PRN
Status: DISCONTINUED | OUTPATIENT
Start: 2022-05-18 | End: 2022-05-20 | Stop reason: HOSPADM

## 2022-05-18 RX ORDER — ACETAMINOPHEN 325 MG/1
650 TABLET ORAL EVERY 6 HOURS PRN
Status: DISCONTINUED | OUTPATIENT
Start: 2022-05-18 | End: 2022-05-20 | Stop reason: HOSPADM

## 2022-05-18 RX ORDER — ATORVASTATIN CALCIUM 80 MG/1
80 TABLET, FILM COATED ORAL NIGHTLY
Status: DISCONTINUED | OUTPATIENT
Start: 2022-05-18 | End: 2022-05-20 | Stop reason: HOSPADM

## 2022-05-18 RX ORDER — FENTANYL CITRATE 50 UG/ML
50 INJECTION, SOLUTION INTRAMUSCULAR; INTRAVENOUS
Status: DISCONTINUED | OUTPATIENT
Start: 2022-05-18 | End: 2022-05-18 | Stop reason: HOSPADM

## 2022-05-18 RX ORDER — TRAZODONE HYDROCHLORIDE 50 MG/1
50 TABLET ORAL NIGHTLY
Status: DISCONTINUED | OUTPATIENT
Start: 2022-05-18 | End: 2022-05-20 | Stop reason: HOSPADM

## 2022-05-18 RX ORDER — LIDOCAINE HYDROCHLORIDE 40 MG/ML
SOLUTION TOPICAL AS NEEDED
Status: DISCONTINUED | OUTPATIENT
Start: 2022-05-18 | End: 2022-05-18 | Stop reason: SURG

## 2022-05-18 RX ORDER — LIDOCAINE HYDROCHLORIDE 10 MG/ML
0.5 INJECTION, SOLUTION EPIDURAL; INFILTRATION; INTRACAUDAL; PERINEURAL ONCE AS NEEDED
Status: DISCONTINUED | OUTPATIENT
Start: 2022-05-18 | End: 2022-05-18 | Stop reason: HOSPADM

## 2022-05-18 RX ORDER — HYDROMORPHONE HYDROCHLORIDE 1 MG/ML
0.5 INJECTION, SOLUTION INTRAMUSCULAR; INTRAVENOUS; SUBCUTANEOUS
Status: DISCONTINUED | OUTPATIENT
Start: 2022-05-18 | End: 2022-05-18 | Stop reason: HOSPADM

## 2022-05-18 RX ORDER — MORPHINE SULFATE 10 MG/ML
1 INJECTION INTRAMUSCULAR; INTRAVENOUS; SUBCUTANEOUS EVERY 4 HOURS PRN
Status: DISCONTINUED | OUTPATIENT
Start: 2022-05-18 | End: 2022-05-20 | Stop reason: HOSPADM

## 2022-05-18 RX ORDER — LIDOCAINE HYDROCHLORIDE 20 MG/ML
INJECTION, SOLUTION INFILTRATION; PERINEURAL AS NEEDED
Status: DISCONTINUED | OUTPATIENT
Start: 2022-05-18 | End: 2022-05-18 | Stop reason: SURG

## 2022-05-18 RX ORDER — ONDANSETRON 4 MG/1
4 TABLET, FILM COATED ORAL EVERY 6 HOURS PRN
Status: DISCONTINUED | OUTPATIENT
Start: 2022-05-18 | End: 2022-05-20 | Stop reason: HOSPADM

## 2022-05-18 RX ORDER — DIPHENHYDRAMINE HCL 25 MG
25 CAPSULE ORAL
Status: DISCONTINUED | OUTPATIENT
Start: 2022-05-18 | End: 2022-05-18 | Stop reason: HOSPADM

## 2022-05-18 RX ORDER — EPHEDRINE SULFATE 50 MG/ML
5 INJECTION, SOLUTION INTRAVENOUS ONCE AS NEEDED
Status: DISCONTINUED | OUTPATIENT
Start: 2022-05-18 | End: 2022-05-18 | Stop reason: HOSPADM

## 2022-05-18 RX ORDER — ONDANSETRON 2 MG/ML
INJECTION INTRAMUSCULAR; INTRAVENOUS AS NEEDED
Status: DISCONTINUED | OUTPATIENT
Start: 2022-05-18 | End: 2022-05-18 | Stop reason: SURG

## 2022-05-18 RX ORDER — EPHEDRINE SULFATE 50 MG/ML
INJECTION, SOLUTION INTRAVENOUS AS NEEDED
Status: DISCONTINUED | OUTPATIENT
Start: 2022-05-18 | End: 2022-05-18 | Stop reason: SURG

## 2022-05-18 RX ORDER — LABETALOL HYDROCHLORIDE 5 MG/ML
5 INJECTION, SOLUTION INTRAVENOUS
Status: DISCONTINUED | OUTPATIENT
Start: 2022-05-18 | End: 2022-05-18 | Stop reason: HOSPADM

## 2022-05-18 RX ORDER — MIDAZOLAM HYDROCHLORIDE 1 MG/ML
0.5 INJECTION INTRAMUSCULAR; INTRAVENOUS
Status: DISCONTINUED | OUTPATIENT
Start: 2022-05-18 | End: 2022-05-18 | Stop reason: HOSPADM

## 2022-05-18 RX ORDER — FENTANYL CITRATE 50 UG/ML
INJECTION, SOLUTION INTRAMUSCULAR; INTRAVENOUS AS NEEDED
Status: DISCONTINUED | OUTPATIENT
Start: 2022-05-18 | End: 2022-05-18 | Stop reason: SURG

## 2022-05-18 RX ORDER — DIPHENHYDRAMINE HYDROCHLORIDE 50 MG/ML
12.5 INJECTION INTRAMUSCULAR; INTRAVENOUS
Status: DISCONTINUED | OUTPATIENT
Start: 2022-05-18 | End: 2022-05-18 | Stop reason: HOSPADM

## 2022-05-18 RX ORDER — ONDANSETRON 2 MG/ML
4 INJECTION INTRAMUSCULAR; INTRAVENOUS ONCE AS NEEDED
Status: DISCONTINUED | OUTPATIENT
Start: 2022-05-18 | End: 2022-05-18 | Stop reason: HOSPADM

## 2022-05-18 RX ORDER — CEFAZOLIN SODIUM 2 G/100ML
2 INJECTION, SOLUTION INTRAVENOUS EVERY 8 HOURS
Status: COMPLETED | OUTPATIENT
Start: 2022-05-18 | End: 2022-05-19

## 2022-05-18 RX ORDER — FLUMAZENIL 0.1 MG/ML
0.2 INJECTION INTRAVENOUS AS NEEDED
Status: DISCONTINUED | OUTPATIENT
Start: 2022-05-18 | End: 2022-05-18 | Stop reason: HOSPADM

## 2022-05-18 RX ORDER — ROCURONIUM BROMIDE 10 MG/ML
INJECTION, SOLUTION INTRAVENOUS AS NEEDED
Status: DISCONTINUED | OUTPATIENT
Start: 2022-05-18 | End: 2022-05-18 | Stop reason: SURG

## 2022-05-18 RX ORDER — ONDANSETRON 2 MG/ML
4 INJECTION INTRAMUSCULAR; INTRAVENOUS EVERY 6 HOURS PRN
Status: DISCONTINUED | OUTPATIENT
Start: 2022-05-18 | End: 2022-05-20 | Stop reason: HOSPADM

## 2022-05-18 RX ORDER — HYDRALAZINE HYDROCHLORIDE 20 MG/ML
5 INJECTION INTRAMUSCULAR; INTRAVENOUS
Status: DISCONTINUED | OUTPATIENT
Start: 2022-05-18 | End: 2022-05-18 | Stop reason: HOSPADM

## 2022-05-18 RX ORDER — PROPOFOL 10 MG/ML
VIAL (ML) INTRAVENOUS AS NEEDED
Status: DISCONTINUED | OUTPATIENT
Start: 2022-05-18 | End: 2022-05-18 | Stop reason: SURG

## 2022-05-18 RX ADMIN — SUGAMMADEX 200 MG: 100 INJECTION, SOLUTION INTRAVENOUS at 08:37

## 2022-05-18 RX ADMIN — ROCURONIUM BROMIDE 20 MG: 50 INJECTION INTRAVENOUS at 08:14

## 2022-05-18 RX ADMIN — EPHEDRINE SULFATE 10 MG: 50 INJECTION INTRAVENOUS at 07:52

## 2022-05-18 RX ADMIN — POTASSIUM CHLORIDE AND SODIUM CHLORIDE 100 ML/HR: 900; 150 INJECTION, SOLUTION INTRAVENOUS at 18:22

## 2022-05-18 RX ADMIN — LIDOCAINE HYDROCHLORIDE 100 MG: 20 INJECTION, SOLUTION INFILTRATION; PERINEURAL at 07:41

## 2022-05-18 RX ADMIN — ROCURONIUM BROMIDE 50 MG: 50 INJECTION INTRAVENOUS at 07:41

## 2022-05-18 RX ADMIN — FENTANYL CITRATE 50 MCG: 0.05 INJECTION, SOLUTION INTRAMUSCULAR; INTRAVENOUS at 07:41

## 2022-05-18 RX ADMIN — EPHEDRINE SULFATE 10 MG: 50 INJECTION INTRAVENOUS at 07:48

## 2022-05-18 RX ADMIN — ONDANSETRON 4 MG: 2 INJECTION INTRAMUSCULAR; INTRAVENOUS at 08:36

## 2022-05-18 RX ADMIN — FENTANYL CITRATE 50 MCG: 0.05 INJECTION, SOLUTION INTRAMUSCULAR; INTRAVENOUS at 08:14

## 2022-05-18 RX ADMIN — AMLODIPINE BESYLATE 5 MG: 5 TABLET ORAL at 18:24

## 2022-05-18 RX ADMIN — ATORVASTATIN CALCIUM 80 MG: 80 TABLET, FILM COATED ORAL at 18:23

## 2022-05-18 RX ADMIN — FAMOTIDINE 20 MG: 10 INJECTION INTRAVENOUS at 07:28

## 2022-05-18 RX ADMIN — TRAZODONE HYDROCHLORIDE 50 MG: 50 TABLET ORAL at 22:04

## 2022-05-18 RX ADMIN — PHENYLEPHRINE HYDROCHLORIDE 100 MCG: 10 INJECTION, SOLUTION INTRAVENOUS at 07:58

## 2022-05-18 RX ADMIN — SODIUM CHLORIDE, POTASSIUM CHLORIDE, SODIUM LACTATE AND CALCIUM CHLORIDE 9 ML/HR: 600; 310; 30; 20 INJECTION, SOLUTION INTRAVENOUS at 07:31

## 2022-05-18 RX ADMIN — PROPOFOL 200 MG: 10 INJECTION, EMULSION INTRAVENOUS at 07:41

## 2022-05-18 RX ADMIN — DEXAMETHASONE SODIUM PHOSPHATE 6 MG: 10 INJECTION INTRAMUSCULAR; INTRAVENOUS at 08:19

## 2022-05-18 RX ADMIN — CEFAZOLIN SODIUM 2 G: 2 INJECTION, SOLUTION INTRAVENOUS at 18:23

## 2022-05-18 RX ADMIN — CEFAZOLIN SODIUM 2 G: 2 INJECTION, SOLUTION INTRAVENOUS at 07:28

## 2022-05-18 RX ADMIN — LIDOCAINE HYDROCHLORIDE 1 EACH: 40 SOLUTION TOPICAL at 07:43

## 2022-05-18 NOTE — ANESTHESIA POSTPROCEDURE EVALUATION
Patient: Sharif Mae    Procedure Summary     Date: 05/18/22 Room / Location: [facility] OR [address] [place] [address] MAIN OR    Anesthesia Start: 0734 Anesthesia Stop: 0857    Procedures:       VENTRICULAR PERITONEAL SHUNT INSERTION (N/A Head)      LAPAROSCOPIC ASSISTED VENTRICULAR PERITONEAL SHUNT REVISION (N/A ) Diagnosis:       Normal pressure hydrocephalus (HCC)      (Normal pressure hydrocephalus (HCC) [G91.2])    Surgeons: Aquiles Pearce MD; Kingston Petty MD Provider: Quinton Haney MD    Anesthesia Type: general ASA Status: 3          Anesthesia Type: general    Vitals  Vitals Value Taken Time   /76 05/18/22 1051   Temp 36.4 °C (97.5 °F) 05/18/22 0852   Pulse 60 05/18/22 1055   Resp 16 05/18/22 1035   SpO2 96 % 05/18/22 1055   Vitals shown include unvalidated device data.        Post Anesthesia Care and Evaluation    Patient location during evaluation: PACU  Patient participation: complete - patient participated  Level of consciousness: awake and alert  Pain management: adequate  Airway patency: patent  Anesthetic complications: No anesthetic complications  PONV Status: none  Cardiovascular status: acceptable and hemodynamically stable  Respiratory status: acceptable, nonlabored ventilation and spontaneous ventilation  Hydration status: acceptable

## 2022-05-18 NOTE — ANESTHESIA PREPROCEDURE EVALUATION
Anesthesia Evaluation     Patient summary reviewed and Nursing notes reviewed                Airway   Mallampati: II  TM distance: >3 FB  Neck ROM: full  No difficulty expected  Comment: Full beard  Dental    (+) poor dentition        Pulmonary - normal exam   (+) a smoker Former, sleep apnea on CPAP,     ROS comment: Noncompliant with CPAP  Cardiovascular - normal exam    ECG reviewed  Rhythm: regular  Rate: normal    (+) hypertension less than 2 medications, CAD, CABG >6 Months, dysrhythmias, hyperlipidemia,     ROS comment: Hx CABG 20 yrs ago/EF 66% by ECHO 10/21/RBBB    Neuro/Psych    ROS Comment: Normal pressure hydrocephalus/unsteady gait/memory impairment/pt denies hx of CVA  GI/Hepatic/Renal/Endo    (+) obesity, morbid obesity,      Musculoskeletal         ROS comment: Lumbar stenosis  Abdominal   (+) obese,    Substance History      OB/GYN          Other      history of cancer      Other Comment: SCCA skin                  Anesthesia Plan    ASA 3     general     intravenous induction     Anesthetic plan, all risks, benefits, and alternatives have been provided, discussed and informed consent has been obtained with: patient.    Plan discussed with CRNA.        CODE STATUS:

## 2022-05-18 NOTE — ANESTHESIA PROCEDURE NOTES
Airway  Urgency: elective    Date/Time: 5/18/2022 7:43 AM  Airway not difficult    General Information and Staff    Patient location during procedure: OR  CRNA/CAA: Xiomy Gann CRNA    Indications and Patient Condition  Indications for airway management: airway protection    Preoxygenated: yes  MILS not maintained throughout  Mask difficulty assessment: 1 - vent by mask    Final Airway Details  Final airway type: endotracheal airway      Successful airway: ETT  Cuffed: yes   Successful intubation technique: direct laryngoscopy  Endotracheal tube insertion site: oral  Blade: Emeterio  Blade size: 4  ETT size (mm): 7.5  Cormack-Lehane Classification: grade I - full view of glottis  Placement verified by: chest auscultation and capnometry   Measured from: lips  ETT/EBT  to lips (cm): 22  Number of attempts at approach: 1  Assessment: lips, teeth, and gum same as pre-op and atraumatic intubation    Additional Comments  Dentition intact and unchanged. CBEBS.  +ETCO2.

## 2022-05-19 ENCOUNTER — APPOINTMENT (OUTPATIENT)
Dept: GENERAL RADIOLOGY | Facility: HOSPITAL | Age: 77
End: 2022-05-19

## 2022-05-19 ENCOUNTER — APPOINTMENT (OUTPATIENT)
Dept: CT IMAGING | Facility: HOSPITAL | Age: 77
End: 2022-05-19

## 2022-05-19 LAB
ANION GAP SERPL CALCULATED.3IONS-SCNC: 9 MMOL/L (ref 5–15)
BASOPHILS # BLD AUTO: 0.01 10*3/MM3 (ref 0–0.2)
BASOPHILS NFR BLD AUTO: 0.1 % (ref 0–1.5)
BUN SERPL-MCNC: 11 MG/DL (ref 8–23)
BUN/CREAT SERPL: 10.7 (ref 7–25)
CALCIUM SPEC-SCNC: 8.6 MG/DL (ref 8.6–10.5)
CHLORIDE SERPL-SCNC: 104 MMOL/L (ref 98–107)
CO2 SERPL-SCNC: 24 MMOL/L (ref 22–29)
CREAT SERPL-MCNC: 1.03 MG/DL (ref 0.76–1.27)
DEPRECATED RDW RBC AUTO: 52.7 FL (ref 37–54)
EGFRCR SERPLBLD CKD-EPI 2021: 74.4 ML/MIN/1.73
EOSINOPHIL # BLD AUTO: 0.01 10*3/MM3 (ref 0–0.4)
EOSINOPHIL NFR BLD AUTO: 0.1 % (ref 0.3–6.2)
ERYTHROCYTE [DISTWIDTH] IN BLOOD BY AUTOMATED COUNT: 13.9 % (ref 12.3–15.4)
GLUCOSE SERPL-MCNC: 121 MG/DL (ref 65–99)
HCT VFR BLD AUTO: 36.6 % (ref 37.5–51)
HGB BLD-MCNC: 12.1 G/DL (ref 13–17.7)
LYMPHOCYTES # BLD AUTO: 1.45 10*3/MM3 (ref 0.7–3.1)
LYMPHOCYTES NFR BLD AUTO: 17 % (ref 19.6–45.3)
MCH RBC QN AUTO: 33.8 PG (ref 26.6–33)
MCHC RBC AUTO-ENTMCNC: 33.1 G/DL (ref 31.5–35.7)
MCV RBC AUTO: 102.2 FL (ref 79–97)
MONOCYTES # BLD AUTO: 0.67 10*3/MM3 (ref 0.1–0.9)
MONOCYTES NFR BLD AUTO: 7.8 % (ref 5–12)
NEUTROPHILS NFR BLD AUTO: 6.37 10*3/MM3 (ref 1.7–7)
NEUTROPHILS NFR BLD AUTO: 74.6 % (ref 42.7–76)
PLATELET # BLD AUTO: 128 10*3/MM3 (ref 140–450)
PMV BLD AUTO: 10.8 FL (ref 6–12)
POTASSIUM SERPL-SCNC: 4 MMOL/L (ref 3.5–5.2)
RBC # BLD AUTO: 3.58 10*6/MM3 (ref 4.14–5.8)
SODIUM SERPL-SCNC: 137 MMOL/L (ref 136–145)
WBC NRBC COR # BLD: 8.54 10*3/MM3 (ref 3.4–10.8)

## 2022-05-19 PROCEDURE — A9270 NON-COVERED ITEM OR SERVICE: HCPCS | Performed by: INTERNAL MEDICINE

## 2022-05-19 PROCEDURE — G0378 HOSPITAL OBSERVATION PER HR: HCPCS

## 2022-05-19 PROCEDURE — 97110 THERAPEUTIC EXERCISES: CPT

## 2022-05-19 PROCEDURE — 25010000002 CEFAZOLIN IN DEXTROSE 2-4 GM/100ML-% SOLUTION: Performed by: NEUROLOGICAL SURGERY

## 2022-05-19 PROCEDURE — A9270 NON-COVERED ITEM OR SERVICE: HCPCS | Performed by: NEUROLOGICAL SURGERY

## 2022-05-19 PROCEDURE — 70250 X-RAY EXAM OF SKULL: CPT

## 2022-05-19 PROCEDURE — 63710000001 AMLODIPINE 5 MG TABLET: Performed by: INTERNAL MEDICINE

## 2022-05-19 PROCEDURE — 70450 CT HEAD/BRAIN W/O DYE: CPT

## 2022-05-19 PROCEDURE — 71046 X-RAY EXAM CHEST 2 VIEWS: CPT

## 2022-05-19 PROCEDURE — 63710000001 TRAZODONE 50 MG TABLET: Performed by: NEUROLOGICAL SURGERY

## 2022-05-19 PROCEDURE — 80048 BASIC METABOLIC PNL TOTAL CA: CPT | Performed by: INTERNAL MEDICINE

## 2022-05-19 PROCEDURE — 94762 N-INVAS EAR/PLS OXIMTRY CONT: CPT

## 2022-05-19 PROCEDURE — 74018 RADEX ABDOMEN 1 VIEW: CPT

## 2022-05-19 PROCEDURE — 99024 POSTOP FOLLOW-UP VISIT: CPT | Performed by: NURSE PRACTITIONER

## 2022-05-19 PROCEDURE — 63710000001 TAMSULOSIN 0.4 MG CAPSULE: Performed by: HOSPITALIST

## 2022-05-19 PROCEDURE — A9270 NON-COVERED ITEM OR SERVICE: HCPCS | Performed by: HOSPITALIST

## 2022-05-19 PROCEDURE — 63710000001 ATORVASTATIN 80 MG TABLET: Performed by: NEUROLOGICAL SURGERY

## 2022-05-19 PROCEDURE — 85025 COMPLETE CBC W/AUTO DIFF WBC: CPT | Performed by: NEUROLOGICAL SURGERY

## 2022-05-19 PROCEDURE — 97162 PT EVAL MOD COMPLEX 30 MIN: CPT

## 2022-05-19 RX ORDER — TAMSULOSIN HYDROCHLORIDE 0.4 MG/1
0.4 CAPSULE ORAL NIGHTLY
Status: DISCONTINUED | OUTPATIENT
Start: 2022-05-19 | End: 2022-05-20 | Stop reason: HOSPADM

## 2022-05-19 RX ADMIN — TRAZODONE HYDROCHLORIDE 50 MG: 50 TABLET ORAL at 22:24

## 2022-05-19 RX ADMIN — CEFAZOLIN SODIUM 2 G: 2 INJECTION, SOLUTION INTRAVENOUS at 02:25

## 2022-05-19 RX ADMIN — ATORVASTATIN CALCIUM 80 MG: 80 TABLET, FILM COATED ORAL at 20:10

## 2022-05-19 RX ADMIN — TAMSULOSIN HYDROCHLORIDE 0.4 MG: 0.4 CAPSULE ORAL at 20:10

## 2022-05-19 RX ADMIN — AMLODIPINE BESYLATE 5 MG: 5 TABLET ORAL at 08:46

## 2022-05-20 VITALS
DIASTOLIC BLOOD PRESSURE: 82 MMHG | BODY MASS INDEX: 35.22 KG/M2 | SYSTOLIC BLOOD PRESSURE: 144 MMHG | HEART RATE: 55 BPM | RESPIRATION RATE: 16 BRPM | OXYGEN SATURATION: 94 % | WEIGHT: 224.87 LBS | TEMPERATURE: 97.4 F

## 2022-05-20 LAB
DEPRECATED RDW RBC AUTO: 48.5 FL (ref 37–54)
ERYTHROCYTE [DISTWIDTH] IN BLOOD BY AUTOMATED COUNT: 13.5 % (ref 12.3–15.4)
HCT VFR BLD AUTO: 35 % (ref 37.5–51)
HGB BLD-MCNC: 12.3 G/DL (ref 13–17.7)
MCH RBC QN AUTO: 34.4 PG (ref 26.6–33)
MCHC RBC AUTO-ENTMCNC: 35.1 G/DL (ref 31.5–35.7)
MCV RBC AUTO: 97.8 FL (ref 79–97)
PLATELET # BLD AUTO: 118 10*3/MM3 (ref 140–450)
PMV BLD AUTO: 11.1 FL (ref 6–12)
RBC # BLD AUTO: 3.58 10*6/MM3 (ref 4.14–5.8)
WBC NRBC COR # BLD: 6.38 10*3/MM3 (ref 3.4–10.8)

## 2022-05-20 PROCEDURE — 63710000001 AMLODIPINE 5 MG TABLET: Performed by: INTERNAL MEDICINE

## 2022-05-20 PROCEDURE — A9270 NON-COVERED ITEM OR SERVICE: HCPCS | Performed by: INTERNAL MEDICINE

## 2022-05-20 PROCEDURE — 85027 COMPLETE CBC AUTOMATED: CPT | Performed by: NURSE PRACTITIONER

## 2022-05-20 RX ADMIN — AMLODIPINE BESYLATE 5 MG: 5 TABLET ORAL at 08:19

## 2022-05-23 ENCOUNTER — TELEPHONE (OUTPATIENT)
Dept: NEUROSURGERY | Facility: CLINIC | Age: 77
End: 2022-05-23

## 2022-05-23 NOTE — TELEPHONE ENCOUNTER
RICCARDO for patient/patient's wife and informed of appt with Dr. Pearce on 05.31.2022 at 3pm    ----- Message from FRANSISCO Bundy sent at 5/20/2022 12:14 PM EDT -----  Regarding: f/up  I am not sure if I put in a note for a follow-up in 10 days to have staples removed.  He had a  shunt placed.  In addition, wife is wanting an order for outpatient physical therapy, to assist with his gait.  Do mind order that for me?  Thank you

## 2022-06-01 NOTE — PROGRESS NOTES
"Subjective   Patient ID: Sharif Mae is a 76 y.o. male is here today for 2 week PO follow-up. Patient had a VENTRICULAR PERITONEAL SHUNT INSERTION on 05.18.2022.     Today patient states that he feels well overall. Patient denies HA's, lightheadedness/dizziness.Paateint 's incision looks healthy, no redness, no drainage, no swelling.      Patient, Provider, and MA re all wearing a mask in our office today.     History of Present Illness    This patient feels a lot better since his surgery.  He is walking better.  He does not have any symptoms at all.  His incisions look great.    The following portions of the patient's history were reviewed and updated as appropriate: allergies, current medications, past family history, past medical history, past social history, past surgical history and problem list.    Review of Systems   Constitutional: Negative for chills and fever.   HENT: Negative for congestion.    Musculoskeletal: Positive for gait problem.   Neurological: Negative for dizziness, weakness, light-headedness and headaches.       I have reviewed the review of systems as documented by my MA.      Objective     Vitals:    06/02/22 0856   BP: 135/84   Cuff Size: Adult   Pulse: 80   Temp: 98 °F (36.7 °C)   SpO2: 96%   Weight: 102 kg (224 lb 13.9 oz)   Height: 170.2 cm (67\")     Body mass index is 35.22 kg/m².      Physical Exam  Neurologic Exam        Assessment & Plan   Independent Review of Radiographic Studies:      I personally reviewed the images from the following studies.    There is no new imaging to review    Medical Decision Making:      I told the patient that we will take his staples out and see him back in about 4 weeks with a CT of his head.    Diagnoses and all orders for this visit:    1. Follow-up examination following surgery (Primary)  -     CT Head Without Contrast; Future      Return in about 4 weeks (around 6/30/2022).         "

## 2022-06-02 ENCOUNTER — OFFICE VISIT (OUTPATIENT)
Dept: NEUROSURGERY | Facility: CLINIC | Age: 77
End: 2022-06-02

## 2022-06-02 VITALS
TEMPERATURE: 98 F | HEIGHT: 67 IN | HEART RATE: 80 BPM | OXYGEN SATURATION: 96 % | BODY MASS INDEX: 35.29 KG/M2 | SYSTOLIC BLOOD PRESSURE: 135 MMHG | DIASTOLIC BLOOD PRESSURE: 84 MMHG | WEIGHT: 224.87 LBS

## 2022-06-02 DIAGNOSIS — Z09 FOLLOW-UP EXAMINATION FOLLOWING SURGERY: Primary | ICD-10-CM

## 2022-06-02 PROCEDURE — 99024 POSTOP FOLLOW-UP VISIT: CPT | Performed by: NEUROLOGICAL SURGERY

## 2022-06-07 DIAGNOSIS — R26.9 ABNORMALITY OF GAIT: Primary | ICD-10-CM

## 2022-06-07 DIAGNOSIS — G91.2 NORMAL PRESSURE HYDROCEPHALUS: ICD-10-CM

## 2022-06-24 ENCOUNTER — TELEPHONE (OUTPATIENT)
Dept: NEUROSURGERY | Facility: CLINIC | Age: 77
End: 2022-06-24

## 2022-06-24 NOTE — TELEPHONE ENCOUNTER
Mr. Antonio wife called to inform us his CT scan is not scheduled until 07/13 and that he will have to reschedule his appointment. Please reschedule [facility] appointment.

## 2022-07-13 ENCOUNTER — HOSPITAL ENCOUNTER (OUTPATIENT)
Dept: CT IMAGING | Facility: HOSPITAL | Age: 77
Discharge: HOME | End: 2022-07-13
Admitting: NEUROLOGICAL SURGERY

## 2022-07-13 DIAGNOSIS — Z09 FOLLOW-UP EXAMINATION FOLLOWING SURGERY: ICD-10-CM

## 2022-07-13 PROCEDURE — 70450 CT HEAD/BRAIN W/O DYE: CPT

## 2022-07-21 ENCOUNTER — TELEPHONE (OUTPATIENT)
Dept: NEUROSURGERY | Facility: CLINIC | Age: 77
End: 2022-07-21

## 2022-07-21 NOTE — TELEPHONE ENCOUNTER
I spoke with Ms. Mae and she reports that Mr. Mae will be unable to make his appointment today as he is vomiting and has diarrhea. He is one month PO and Dr. Pearce does not have anything available until the end of august and unsure if he needed to be seen before than. Please advise.

## 2022-07-21 NOTE — TELEPHONE ENCOUNTER
LM for Mrs Mae asking if they want to do a televisit instead, asked that she call the office back and let us know

## 2022-09-07 NOTE — PROGRESS NOTES
"Subjective   Patient ID: Sharif Mae is a 77 y.o. male is here today for 1 month PO follow-up with a new CT Head done on 07/13/2022 at [facility]. Patient had a VENTRICULAR PERITONEAL SHUNT INSERTION on 05/18/2022    Today patient states that he feels well overall. Patient denies HA's, visual changes, but he has gait/balance issues.     Patient, Provider, and MA are all wearing a mask in our office today.     History of Present Illness    This patient returns today.  He is doing better than he was prior to the shunt.  His memory and mind seem to be improving.    The following portions of the patient's history were reviewed and updated as appropriate: allergies, current medications, past family history, past medical history, past social history, past surgical history and problem list.    Review of Systems   Constitutional: Negative for chills and fever.   HENT: Negative for congestion.    Eyes: Negative for photophobia and visual disturbance.   Musculoskeletal: Positive for gait problem.   Neurological: Negative for weakness and headaches.       I have reviewed the review of systems as documented by my MA.      Objective     Vitals:    09/08/22 1142   BP: 130/80   Cuff Size: Adult   Pulse: 94   Temp: 98 °F (36.7 °C)   SpO2: 97%   Weight: 102 kg (224 lb 13.9 oz)   Height: 170.2 cm (67\")     Body mass index is 35.22 kg/m².      Physical Exam  Neurological:      Mental Status: He is alert and oriented to person, place, and time.       Neurologic Exam     Mental Status   Oriented to person, place, and time.     His shunt pumps and refills well.      Assessment & Plan   Independent Review of Radiographic Studies:      I personally reviewed the images from the following studies.    I reviewed the CT from July 13.  This looks good with no evidence of a subdural.    Medical Decision Making:      I told the patient and his wife about the imaging.  I told him that from my point of view I would not do anything further " at this point.  Told him to call me if any problems develop.  I will see him back in anytime.    Diagnoses and all orders for this visit:    1. Normal pressure hydrocephalus (HCC) (Primary)      Return if symptoms worsen or fail to improve.

## 2022-09-08 ENCOUNTER — OFFICE VISIT (OUTPATIENT)
Dept: NEUROSURGERY | Facility: CLINIC | Age: 77
End: 2022-09-08

## 2022-09-08 VITALS
DIASTOLIC BLOOD PRESSURE: 80 MMHG | WEIGHT: 224.87 LBS | BODY MASS INDEX: 35.29 KG/M2 | TEMPERATURE: 98 F | SYSTOLIC BLOOD PRESSURE: 130 MMHG | HEART RATE: 94 BPM | OXYGEN SATURATION: 97 % | HEIGHT: 67 IN

## 2022-09-08 DIAGNOSIS — G91.2 NORMAL PRESSURE HYDROCEPHALUS: Primary | ICD-10-CM

## 2022-09-08 PROCEDURE — 99213 OFFICE O/P EST LOW 20 MIN: CPT | Performed by: NEUROLOGICAL SURGERY

## 2023-03-13 ENCOUNTER — APPOINTMENT (OUTPATIENT)
Dept: CT IMAGING | Facility: HOSPITAL | Age: 78
End: 2023-03-13
Payer: MEDICARE

## 2023-03-13 ENCOUNTER — HOSPITAL ENCOUNTER (OUTPATIENT)
Facility: HOSPITAL | Age: 78
Setting detail: OBSERVATION
Discharge: HOME HEALTH | End: 2023-03-16
Attending: EMERGENCY MEDICINE | Admitting: INTERNAL MEDICINE
Payer: MEDICARE

## 2023-03-13 DIAGNOSIS — R26.9 GAIT DISTURBANCE: Primary | ICD-10-CM

## 2023-03-13 DIAGNOSIS — R53.1 GENERALIZED WEAKNESS: ICD-10-CM

## 2023-03-13 DIAGNOSIS — N28.9 ACUTE RENAL INSUFFICIENCY: ICD-10-CM

## 2023-03-13 DIAGNOSIS — R32 URINARY INCONTINENCE, UNSPECIFIED TYPE: ICD-10-CM

## 2023-03-13 LAB
ALBUMIN SERPL-MCNC: 4.4 G/DL (ref 3.5–5.2)
ALBUMIN/GLOB SERPL: 1.8 G/DL
ALP SERPL-CCNC: 80 U/L (ref 39–117)
ALT SERPL W P-5'-P-CCNC: 45 U/L (ref 1–41)
ANION GAP SERPL CALCULATED.3IONS-SCNC: 14.3 MMOL/L (ref 5–15)
AST SERPL-CCNC: 22 U/L (ref 1–40)
BASOPHILS # BLD AUTO: 0.03 10*3/MM3 (ref 0–0.2)
BASOPHILS NFR BLD AUTO: 0.5 % (ref 0–1.5)
BILIRUB SERPL-MCNC: 0.4 MG/DL (ref 0–1.2)
BILIRUB UR QL STRIP: NEGATIVE
BUN SERPL-MCNC: 23 MG/DL (ref 8–23)
BUN/CREAT SERPL: 15.8 (ref 7–25)
CALCIUM SPEC-SCNC: 9.5 MG/DL (ref 8.6–10.5)
CHLORIDE SERPL-SCNC: 104 MMOL/L (ref 98–107)
CLARITY UR: CLEAR
CO2 SERPL-SCNC: 23.7 MMOL/L (ref 22–29)
COLOR UR: YELLOW
CREAT SERPL-MCNC: 1.46 MG/DL (ref 0.76–1.27)
DEPRECATED RDW RBC AUTO: 45.5 FL (ref 37–54)
EGFRCR SERPLBLD CKD-EPI 2021: 48.6 ML/MIN/1.73
EOSINOPHIL # BLD AUTO: 0.12 10*3/MM3 (ref 0–0.4)
EOSINOPHIL NFR BLD AUTO: 1.8 % (ref 0.3–6.2)
ERYTHROCYTE [DISTWIDTH] IN BLOOD BY AUTOMATED COUNT: 12.8 % (ref 12.3–15.4)
GLOBULIN UR ELPH-MCNC: 2.4 GM/DL
GLUCOSE SERPL-MCNC: 123 MG/DL (ref 65–99)
GLUCOSE UR STRIP-MCNC: NEGATIVE MG/DL
HCT VFR BLD AUTO: 40.3 % (ref 37.5–51)
HGB BLD-MCNC: 14 G/DL (ref 13–17.7)
HGB UR QL STRIP.AUTO: NEGATIVE
IMM GRANULOCYTES # BLD AUTO: 0.01 10*3/MM3 (ref 0–0.05)
IMM GRANULOCYTES NFR BLD AUTO: 0.2 % (ref 0–0.5)
KETONES UR QL STRIP: NEGATIVE
LEUKOCYTE ESTERASE UR QL STRIP.AUTO: NEGATIVE
LYMPHOCYTES # BLD AUTO: 1.92 10*3/MM3 (ref 0.7–3.1)
LYMPHOCYTES NFR BLD AUTO: 29.3 % (ref 19.6–45.3)
MCH RBC QN AUTO: 33.6 PG (ref 26.6–33)
MCHC RBC AUTO-ENTMCNC: 34.7 G/DL (ref 31.5–35.7)
MCV RBC AUTO: 96.6 FL (ref 79–97)
MONOCYTES # BLD AUTO: 0.56 10*3/MM3 (ref 0.1–0.9)
MONOCYTES NFR BLD AUTO: 8.5 % (ref 5–12)
NEUTROPHILS NFR BLD AUTO: 3.91 10*3/MM3 (ref 1.7–7)
NEUTROPHILS NFR BLD AUTO: 59.7 % (ref 42.7–76)
NITRITE UR QL STRIP: NEGATIVE
NRBC BLD AUTO-RTO: 0 /100 WBC (ref 0–0.2)
PH UR STRIP.AUTO: <=5 [PH] (ref 5–8)
PLATELET # BLD AUTO: 167 10*3/MM3 (ref 140–450)
PMV BLD AUTO: 10.9 FL (ref 6–12)
POTASSIUM SERPL-SCNC: 4.1 MMOL/L (ref 3.5–5.2)
PROT SERPL-MCNC: 6.8 G/DL (ref 6–8.5)
PROT UR QL STRIP: NEGATIVE
QT INTERVAL: 432 MS
RBC # BLD AUTO: 4.17 10*6/MM3 (ref 4.14–5.8)
SODIUM SERPL-SCNC: 142 MMOL/L (ref 136–145)
SP GR UR STRIP: 1.02 (ref 1–1.03)
TROPONIN T SERPL HS-MCNC: 12 NG/L
UROBILINOGEN UR QL STRIP: NORMAL
WBC NRBC COR # BLD: 6.55 10*3/MM3 (ref 3.4–10.8)

## 2023-03-13 PROCEDURE — 70450 CT HEAD/BRAIN W/O DYE: CPT

## 2023-03-13 PROCEDURE — 85025 COMPLETE CBC W/AUTO DIFF WBC: CPT | Performed by: EMERGENCY MEDICINE

## 2023-03-13 PROCEDURE — 93010 ELECTROCARDIOGRAM REPORT: CPT | Performed by: INTERNAL MEDICINE

## 2023-03-13 PROCEDURE — 99285 EMERGENCY DEPT VISIT HI MDM: CPT

## 2023-03-13 PROCEDURE — 81003 URINALYSIS AUTO W/O SCOPE: CPT | Performed by: EMERGENCY MEDICINE

## 2023-03-13 PROCEDURE — 80053 COMPREHEN METABOLIC PANEL: CPT | Performed by: EMERGENCY MEDICINE

## 2023-03-13 PROCEDURE — 93005 ELECTROCARDIOGRAM TRACING: CPT | Performed by: EMERGENCY MEDICINE

## 2023-03-13 PROCEDURE — 84484 ASSAY OF TROPONIN QUANT: CPT | Performed by: EMERGENCY MEDICINE

## 2023-03-13 RX ORDER — SODIUM CHLORIDE 0.9 % (FLUSH) 0.9 %
10 SYRINGE (ML) INJECTION AS NEEDED
Status: DISCONTINUED | OUTPATIENT
Start: 2023-03-13 | End: 2023-03-16 | Stop reason: HOSPADM

## 2023-03-13 NOTE — ED TRIAGE NOTES
Pt has gait problems, weakness in the torso, incontinence of both urine and stool, his voice is hoarse.  He is fatigued.  Pt has a  shunt    Patient was placed in face mask during first look triage.  Patient was wearing a face mask throughout encounter.  I wore personal protective equipment throughout the encounter.  Hand hygiene was performed before and after patient encounter.

## 2023-03-14 PROBLEM — Z78.9 ALCOHOL USE: Status: ACTIVE | Noted: 2023-03-14

## 2023-03-14 LAB
ANION GAP SERPL CALCULATED.3IONS-SCNC: 10.7 MMOL/L (ref 5–15)
BASOPHILS # BLD AUTO: 0.03 10*3/MM3 (ref 0–0.2)
BASOPHILS NFR BLD AUTO: 0.4 % (ref 0–1.5)
BUN SERPL-MCNC: 22 MG/DL (ref 8–23)
BUN/CREAT SERPL: 17.1 (ref 7–25)
CALCIUM SPEC-SCNC: 9.5 MG/DL (ref 8.6–10.5)
CHLORIDE SERPL-SCNC: 108 MMOL/L (ref 98–107)
CO2 SERPL-SCNC: 24.3 MMOL/L (ref 22–29)
CREAT SERPL-MCNC: 1.29 MG/DL (ref 0.76–1.27)
DEPRECATED RDW RBC AUTO: 45.4 FL (ref 37–54)
EGFRCR SERPLBLD CKD-EPI 2021: 56.4 ML/MIN/1.73
EOSINOPHIL # BLD AUTO: 0.16 10*3/MM3 (ref 0–0.4)
EOSINOPHIL NFR BLD AUTO: 2.2 % (ref 0.3–6.2)
ERYTHROCYTE [DISTWIDTH] IN BLOOD BY AUTOMATED COUNT: 13.1 % (ref 12.3–15.4)
GLUCOSE SERPL-MCNC: 111 MG/DL (ref 65–99)
HCT VFR BLD AUTO: 38.4 % (ref 37.5–51)
HGB BLD-MCNC: 13 G/DL (ref 13–17.7)
IMM GRANULOCYTES # BLD AUTO: 0.01 10*3/MM3 (ref 0–0.05)
IMM GRANULOCYTES NFR BLD AUTO: 0.1 % (ref 0–0.5)
INR PPP: 1.1 (ref 0.9–1.1)
LYMPHOCYTES # BLD AUTO: 2.47 10*3/MM3 (ref 0.7–3.1)
LYMPHOCYTES NFR BLD AUTO: 34.4 % (ref 19.6–45.3)
MCH RBC QN AUTO: 33.2 PG (ref 26.6–33)
MCHC RBC AUTO-ENTMCNC: 33.9 G/DL (ref 31.5–35.7)
MCV RBC AUTO: 98 FL (ref 79–97)
MONOCYTES # BLD AUTO: 0.63 10*3/MM3 (ref 0.1–0.9)
MONOCYTES NFR BLD AUTO: 8.8 % (ref 5–12)
NEUTROPHILS NFR BLD AUTO: 3.87 10*3/MM3 (ref 1.7–7)
NEUTROPHILS NFR BLD AUTO: 54.1 % (ref 42.7–76)
NRBC BLD AUTO-RTO: 0 /100 WBC (ref 0–0.2)
PLATELET # BLD AUTO: 149 10*3/MM3 (ref 140–450)
PMV BLD AUTO: 10.7 FL (ref 6–12)
POTASSIUM SERPL-SCNC: 3.7 MMOL/L (ref 3.5–5.2)
PROTHROMBIN TIME: 14.4 SECONDS (ref 11.7–14.2)
RBC # BLD AUTO: 3.92 10*6/MM3 (ref 4.14–5.8)
SODIUM SERPL-SCNC: 143 MMOL/L (ref 136–145)
WBC NRBC COR # BLD: 7.17 10*3/MM3 (ref 3.4–10.8)

## 2023-03-14 PROCEDURE — 51798 US URINE CAPACITY MEASURE: CPT

## 2023-03-14 PROCEDURE — 85025 COMPLETE CBC W/AUTO DIFF WBC: CPT | Performed by: NURSE PRACTITIONER

## 2023-03-14 PROCEDURE — 80048 BASIC METABOLIC PNL TOTAL CA: CPT | Performed by: NURSE PRACTITIONER

## 2023-03-14 PROCEDURE — G0378 HOSPITAL OBSERVATION PER HR: HCPCS

## 2023-03-14 PROCEDURE — 25010000002 THIAMINE PER 100 MG: Performed by: NURSE PRACTITIONER

## 2023-03-14 PROCEDURE — 85610 PROTHROMBIN TIME: CPT | Performed by: NURSE PRACTITIONER

## 2023-03-14 PROCEDURE — 99221 1ST HOSP IP/OBS SF/LOW 40: CPT

## 2023-03-14 PROCEDURE — 96374 THER/PROPH/DIAG INJ IV PUSH: CPT

## 2023-03-14 RX ORDER — TRAZODONE HYDROCHLORIDE 50 MG/1
50 TABLET ORAL NIGHTLY
Status: DISCONTINUED | OUTPATIENT
Start: 2023-03-14 | End: 2023-03-16 | Stop reason: HOSPADM

## 2023-03-14 RX ORDER — ROSUVASTATIN CALCIUM 10 MG/1
10 TABLET, COATED ORAL DAILY
Status: DISCONTINUED | OUTPATIENT
Start: 2023-03-14 | End: 2023-03-16 | Stop reason: HOSPADM

## 2023-03-14 RX ORDER — LORAZEPAM 2 MG/ML
0.5 INJECTION INTRAMUSCULAR
Status: DISCONTINUED | OUTPATIENT
Start: 2023-03-14 | End: 2023-03-16 | Stop reason: HOSPADM

## 2023-03-14 RX ORDER — AMLODIPINE BESYLATE 5 MG/1
5 TABLET ORAL DAILY
Status: DISCONTINUED | OUTPATIENT
Start: 2023-03-14 | End: 2023-03-16 | Stop reason: HOSPADM

## 2023-03-14 RX ORDER — LORAZEPAM 2 MG/ML
1 INJECTION INTRAMUSCULAR
Status: DISCONTINUED | OUTPATIENT
Start: 2023-03-14 | End: 2023-03-16 | Stop reason: HOSPADM

## 2023-03-14 RX ORDER — LORAZEPAM 1 MG/1
1 TABLET ORAL
Status: DISCONTINUED | OUTPATIENT
Start: 2023-03-14 | End: 2023-03-16 | Stop reason: HOSPADM

## 2023-03-14 RX ORDER — DONEPEZIL HYDROCHLORIDE 10 MG/1
10 TABLET, FILM COATED ORAL NIGHTLY
COMMUNITY

## 2023-03-14 RX ORDER — ROSUVASTATIN CALCIUM 10 MG/1
10 TABLET, COATED ORAL DAILY
COMMUNITY

## 2023-03-14 RX ORDER — FOLIC ACID 1 MG/1
1 TABLET ORAL DAILY
Status: DISCONTINUED | OUTPATIENT
Start: 2023-03-15 | End: 2023-03-16 | Stop reason: HOSPADM

## 2023-03-14 RX ORDER — LORAZEPAM 0.5 MG/1
0.5 TABLET ORAL
Status: DISCONTINUED | OUTPATIENT
Start: 2023-03-14 | End: 2023-03-16 | Stop reason: HOSPADM

## 2023-03-14 RX ORDER — SODIUM CHLORIDE 0.9 % (FLUSH) 0.9 %
10 SYRINGE (ML) INJECTION AS NEEDED
Status: DISCONTINUED | OUTPATIENT
Start: 2023-03-14 | End: 2023-03-16 | Stop reason: HOSPADM

## 2023-03-14 RX ORDER — TAMSULOSIN HYDROCHLORIDE 0.4 MG/1
0.4 CAPSULE ORAL NIGHTLY
Status: DISCONTINUED | OUTPATIENT
Start: 2023-03-14 | End: 2023-03-16 | Stop reason: HOSPADM

## 2023-03-14 RX ORDER — SODIUM CHLORIDE 0.9 % (FLUSH) 0.9 %
10 SYRINGE (ML) INJECTION EVERY 12 HOURS SCHEDULED
Status: DISCONTINUED | OUTPATIENT
Start: 2023-03-14 | End: 2023-03-16 | Stop reason: HOSPADM

## 2023-03-14 RX ORDER — TADALAFIL 5 MG/1
20 TABLET ORAL
Status: DISCONTINUED | OUTPATIENT
Start: 2023-03-14 | End: 2023-03-16 | Stop reason: HOSPADM

## 2023-03-14 RX ORDER — DONEPEZIL HYDROCHLORIDE 10 MG/1
10 TABLET, FILM COATED ORAL NIGHTLY
Status: DISCONTINUED | OUTPATIENT
Start: 2023-03-14 | End: 2023-03-16 | Stop reason: HOSPADM

## 2023-03-14 RX ORDER — ACETAMINOPHEN 160 MG/5ML
650 SOLUTION ORAL EVERY 4 HOURS PRN
Status: DISCONTINUED | OUTPATIENT
Start: 2023-03-14 | End: 2023-03-16 | Stop reason: HOSPADM

## 2023-03-14 RX ORDER — FINASTERIDE 5 MG/1
5 TABLET, FILM COATED ORAL DAILY
Status: DISCONTINUED | OUTPATIENT
Start: 2023-03-14 | End: 2023-03-16 | Stop reason: HOSPADM

## 2023-03-14 RX ORDER — SODIUM CHLORIDE 9 MG/ML
40 INJECTION, SOLUTION INTRAVENOUS AS NEEDED
Status: DISCONTINUED | OUTPATIENT
Start: 2023-03-14 | End: 2023-03-16 | Stop reason: HOSPADM

## 2023-03-14 RX ORDER — TADALAFIL 20 MG/1
20 TABLET, FILM COATED ORAL
COMMUNITY

## 2023-03-14 RX ORDER — THIAMINE HYDROCHLORIDE 100 MG/ML
100 INJECTION, SOLUTION INTRAMUSCULAR; INTRAVENOUS ONCE
Status: COMPLETED | OUTPATIENT
Start: 2023-03-14 | End: 2023-03-14

## 2023-03-14 RX ORDER — DIPHENOXYLATE HYDROCHLORIDE AND ATROPINE SULFATE 2.5; .025 MG/1; MG/1
1 TABLET ORAL DAILY
Status: DISCONTINUED | OUTPATIENT
Start: 2023-03-15 | End: 2023-03-16 | Stop reason: HOSPADM

## 2023-03-14 RX ORDER — FINASTERIDE 5 MG/1
5 TABLET, FILM COATED ORAL DAILY
COMMUNITY

## 2023-03-14 RX ORDER — MEMANTINE HYDROCHLORIDE 10 MG/1
10 TABLET ORAL EVERY 12 HOURS SCHEDULED
Status: DISCONTINUED | OUTPATIENT
Start: 2023-03-14 | End: 2023-03-16 | Stop reason: HOSPADM

## 2023-03-14 RX ORDER — MEMANTINE HYDROCHLORIDE 28 MG/1
CAPSULE, EXTENDED RELEASE ORAL DAILY
COMMUNITY

## 2023-03-14 RX ORDER — ACETAMINOPHEN 325 MG/1
650 TABLET ORAL EVERY 4 HOURS PRN
Status: DISCONTINUED | OUTPATIENT
Start: 2023-03-14 | End: 2023-03-16 | Stop reason: HOSPADM

## 2023-03-14 RX ORDER — ACETAMINOPHEN 650 MG/1
650 SUPPOSITORY RECTAL EVERY 4 HOURS PRN
Status: DISCONTINUED | OUTPATIENT
Start: 2023-03-14 | End: 2023-03-16 | Stop reason: HOSPADM

## 2023-03-14 RX ADMIN — Medication 10 ML: at 23:15

## 2023-03-14 RX ADMIN — AMLODIPINE BESYLATE 5 MG: 5 TABLET ORAL at 09:42

## 2023-03-14 RX ADMIN — DONEPEZIL HYDROCHLORIDE 10 MG: 10 TABLET, FILM COATED ORAL at 20:10

## 2023-03-14 RX ADMIN — ROSUVASTATIN CALCIUM 10 MG: 10 TABLET, FILM COATED ORAL at 09:42

## 2023-03-14 RX ADMIN — Medication 10 ML: at 09:42

## 2023-03-14 RX ADMIN — TRAZODONE HYDROCHLORIDE 50 MG: 50 TABLET ORAL at 20:10

## 2023-03-14 RX ADMIN — FINASTERIDE 5 MG: 5 TABLET, FILM COATED ORAL at 09:42

## 2023-03-14 RX ADMIN — VORTIOXETINE 20 MG: 5 TABLET, FILM COATED ORAL at 09:42

## 2023-03-14 RX ADMIN — TADALAFIL 20 MG: 5 TABLET, FILM COATED ORAL at 12:33

## 2023-03-14 RX ADMIN — MEMANTINE HYDROCHLORIDE 10 MG: 10 TABLET, FILM COATED ORAL at 20:10

## 2023-03-14 RX ADMIN — THIAMINE HYDROCHLORIDE 100 MG: 100 INJECTION, SOLUTION INTRAMUSCULAR; INTRAVENOUS at 06:34

## 2023-03-14 RX ADMIN — TAMSULOSIN HYDROCHLORIDE 0.4 MG: 0.4 CAPSULE ORAL at 20:10

## 2023-03-14 RX ADMIN — MEMANTINE HYDROCHLORIDE 10 MG: 10 TABLET, FILM COATED ORAL at 09:42

## 2023-03-14 NOTE — CONSULTS
"[facility] NEUROSURGERY CONSULT NOTE    Patient name: Sharif Mae  Referring Provider: Viridiana Mathews  Reason for Consultation: Gait disturbance with urinary incontinence    Patient Care Team:  Ryan Stephen APRN as PCP - General (Nurse Practitioner)    Chief complaint: Worsening gait disturbance and urinary incontinence    Subjective     History of present illness:    Patient is a 77 y.o.male with a history of normal pressure hydrocephalus with placement of right ventriculoperitoneal shunt with a [employer] strata 2 valve set at 1.5 on 5/18/2022.  Over the past month, the patient states that he has experienced a \"gradual onset\" of worsening gait instability and urinary incontinence.  The patient denies headache, traumatic injury, dizziness, fever ,vision changes but reports feeling \"tired\", and \"weak in his feet\".    Review of Systems  Review of Systems   Constitutional: Positive for activity change and fatigue. Negative for chills and fever.   HENT: Negative for hearing loss.    Endocrine: Positive for cold intolerance.   Musculoskeletal: Positive for gait problem.   Skin: Negative for color change and wound.   Neurological: Positive for weakness (in bilateral feet). Negative for dizziness, tremors, facial asymmetry, numbness and headaches.       History  PAST MEDICAL HISTORY  Past Medical History:   Diagnosis Date   • CAD (coronary artery disease)     s/p CABG   • H/O echocardiogram    • Health care maintenance    • History of EKG 07/21/2015   • Hx of CABG    • Hydrocephalus (HCC)    • Hyperlipidemia    • Hypertension    • Lumbar canal stenosis    • Memory impairment     ON MED   • KLEBER on CPAP     NOT CURRENTLY USING AT THIS TIME   • RBBB (right bundle branch block)    • Spondylisthesis    • Squamous cell carcinoma of skin    • Stroke syndrome    • Unsteady gait when walking    • Unsteady gait when walking        PAST SURGICAL HISTORY  Past Surgical History:   Procedure Laterality Date   • CARDIAC " CATHETERIZATION  2004; Showed 90% stenosis in the OM-1, 50% stenosis in the distal left circumflex, 70% stenosis in the Ramus, and 40-50% stenosis in the ditsal RCA.   • CORONARY ARTERY BYPASS GRAFT  2004    LIMA to the LAD, KITTY to the second obtuse marginal, saphenous vein graft to the ramus, and saphenous vein graft to the first obtuse marginal    • KNEE SURGERY Right 2014   •  SHUNT INSERTION N/A 2022    Procedure: VENTRICULAR PERITONEAL SHUNT INSERTION;  Surgeon: Aquiles Pearce MD;  Location: [facility] OR;  Service: Neurosurgery;  Laterality: N/A;       FAMILY HISTORY  Family History   Problem Relation Age of Onset   • Heart disease Mother    • Heart disease Father    • Hypertension Father    • Stroke Father    • Malig Hyperthermia Neg Hx        SOCIAL HISTORY  Social History     Tobacco Use   • Smoking status: Former     Types: Pipe, Cigars     Quit date: 1987     Years since quittin.0   • Smokeless tobacco: Never   • Tobacco comments:     Currently chews cigars   Vaping Use   • Vaping Use: Never used   Substance Use Topics   • Alcohol use: Yes     Alcohol/week: 2.0 standard drinks     Types: 2 Shots of liquor per week     Comment: 2 bourbon drinks daily   • Drug use: Not Currently         Allergies:  Patient has no known allergies.    MEDICATIONS:  Medications Prior to Admission   Medication Sig Dispense Refill Last Dose   • alfuzosin (UROXATRAL) 10 MG 24 hr tablet Take 1 tablet by mouth every night at bedtime.      • amLODIPine (NORVASC) 5 MG tablet Take 1 tablet by mouth Daily. For blood pressure      • donepezil (ARICEPT) 10 MG tablet Take 1 tablet by mouth Every Night.      • finasteride (PROSCAR) 5 MG tablet Take 1 tablet by mouth Daily.      • memantine (NAMENDA XR) 28 MG capsule sustained-release 24 hr extended release capsule Take  by mouth Daily.      • rosuvastatin (CRESTOR) 10 MG tablet Take 1 tablet by mouth Daily.      • tadalafil 20 MG tablet tablet  Take 1 tablet by mouth Daily.      • traZODone (DESYREL) 50 MG tablet Take 1 tablet by mouth Every Night.      • Vortioxetine HBr (Trintellix) 20 MG tablet Take 1 tablet by mouth Daily.          Objective     Results Review:  LABS:    Admission on 03/13/2023   Component Date Value Ref Range Status   • Glucose 03/13/2023 123 (H)  65 - 99 mg/dL Final   • BUN 03/13/2023 23  8 - 23 mg/dL Final   • Creatinine 03/13/2023 1.46 (H)  0.76 - 1.27 mg/dL Final   • Sodium 03/13/2023 142  136 - 145 mmol/L Final   • Potassium 03/13/2023 4.1  3.5 - 5.2 mmol/L Final   • Chloride 03/13/2023 104  98 - 107 mmol/L Final   • CO2 03/13/2023 23.7  22.0 - 29.0 mmol/L Final   • Calcium 03/13/2023 9.5  8.6 - 10.5 mg/dL Final   • Total Protein 03/13/2023 6.8  6.0 - 8.5 g/dL Final   • Albumin 03/13/2023 4.4  3.5 - 5.2 g/dL Final   • ALT (SGPT) 03/13/2023 45 (H)  1 - 41 U/L Final   • AST (SGOT) 03/13/2023 22  1 - 40 U/L Final   • Alkaline Phosphatase 03/13/2023 80  39 - 117 U/L Final   • Total Bilirubin 03/13/2023 0.4  0.0 - 1.2 mg/dL Final   • Globulin 03/13/2023 2.4  gm/dL Final   • A/G Ratio 03/13/2023 1.8  g/dL Final   • BUN/Creatinine Ratio 03/13/2023 15.8  7.0 - 25.0 Final   • Anion Gap 03/13/2023 14.3  5.0 - 15.0 mmol/L Final   • eGFR 03/13/2023 48.6 (L)  >60.0 mL/min/1.73 Final   • Color, UA 03/13/2023 Yellow  Yellow, Straw Final   • Appearance, UA 03/13/2023 Clear  Clear Final   • pH, UA 03/13/2023 <=5.0  5.0 - 8.0 Final   • Specific Gravity, UA 03/13/2023 1.023  1.005 - 1.030 Final   • Glucose, UA 03/13/2023 Negative  Negative Final   • Ketones, UA 03/13/2023 Negative  Negative Final   • Bilirubin, UA 03/13/2023 Negative  Negative Final   • Blood, UA 03/13/2023 Negative  Negative Final   • Protein, UA 03/13/2023 Negative  Negative Final   • Leuk Esterase, UA 03/13/2023 Negative  Negative Final   • Nitrite, UA 03/13/2023 Negative  Negative Final   • Urobilinogen, UA 03/13/2023 0.2 E.U./dL  0.2 - 1.0 E.U./dL Final   • HS Troponin T  03/13/2023 12  <15 ng/L Final   • QT Interval 03/13/2023 432  ms Final   • WBC 03/13/2023 6.55  3.40 - 10.80 10*3/mm3 Final   • RBC 03/13/2023 4.17  4.14 - 5.80 10*6/mm3 Final   • Hemoglobin 03/13/2023 14.0  13.0 - 17.7 g/dL Final   • Hematocrit 03/13/2023 40.3  37.5 - 51.0 % Final   • MCV 03/13/2023 96.6  79.0 - 97.0 fL Final   • MCH 03/13/2023 33.6 (H)  26.6 - 33.0 pg Final   • MCHC 03/13/2023 34.7  31.5 - 35.7 g/dL Final   • RDW 03/13/2023 12.8  12.3 - 15.4 % Final   • RDW-SD 03/13/2023 45.5  37.0 - 54.0 fl Final   • MPV 03/13/2023 10.9  6.0 - 12.0 fL Final   • Platelets 03/13/2023 167  140 - 450 10*3/mm3 Final   • Neutrophil % 03/13/2023 59.7  42.7 - 76.0 % Final   • Lymphocyte % 03/13/2023 29.3  19.6 - 45.3 % Final   • Monocyte % 03/13/2023 8.5  5.0 - 12.0 % Final   • Eosinophil % 03/13/2023 1.8  0.3 - 6.2 % Final   • Basophil % 03/13/2023 0.5  0.0 - 1.5 % Final   • Immature Grans % 03/13/2023 0.2  0.0 - 0.5 % Final   • Neutrophils, Absolute 03/13/2023 3.91  1.70 - 7.00 10*3/mm3 Final   • Lymphocytes, Absolute 03/13/2023 1.92  0.70 - 3.10 10*3/mm3 Final   • Monocytes, Absolute 03/13/2023 0.56  0.10 - 0.90 10*3/mm3 Final   • Eosinophils, Absolute 03/13/2023 0.12  0.00 - 0.40 10*3/mm3 Final   • Basophils, Absolute 03/13/2023 0.03  0.00 - 0.20 10*3/mm3 Final   • Immature Grans, Absolute 03/13/2023 0.01  0.00 - 0.05 10*3/mm3 Final   • nRBC 03/13/2023 0.0  0.0 - 0.2 /100 WBC Final   • Glucose 03/14/2023 111 (H)  65 - 99 mg/dL Final   • BUN 03/14/2023 22  8 - 23 mg/dL Final   • Creatinine 03/14/2023 1.29 (H)  0.76 - 1.27 mg/dL Final   • Sodium 03/14/2023 143  136 - 145 mmol/L Final   • Potassium 03/14/2023 3.7  3.5 - 5.2 mmol/L Final   • Chloride 03/14/2023 108 (H)  98 - 107 mmol/L Final   • CO2 03/14/2023 24.3  22.0 - 29.0 mmol/L Final   • Calcium 03/14/2023 9.5  8.6 - 10.5 mg/dL Final   • BUN/Creatinine Ratio 03/14/2023 17.1  7.0 - 25.0 Final   • Anion Gap 03/14/2023 10.7  5.0 - 15.0 mmol/L Final   • eGFR  03/14/2023 56.4 (L)  >60.0 mL/min/1.73 Final   • WBC 03/14/2023 7.17  3.40 - 10.80 10*3/mm3 Final   • RBC 03/14/2023 3.92 (L)  4.14 - 5.80 10*6/mm3 Final   • Hemoglobin 03/14/2023 13.0  13.0 - 17.7 g/dL Final   • Hematocrit 03/14/2023 38.4  37.5 - 51.0 % Final   • MCV 03/14/2023 98.0 (H)  79.0 - 97.0 fL Final   • MCH 03/14/2023 33.2 (H)  26.6 - 33.0 pg Final   • MCHC 03/14/2023 33.9  31.5 - 35.7 g/dL Final   • RDW 03/14/2023 13.1  12.3 - 15.4 % Final   • RDW-SD 03/14/2023 45.4  37.0 - 54.0 fl Final   • MPV 03/14/2023 10.7  6.0 - 12.0 fL Final   • Platelets 03/14/2023 149  140 - 450 10*3/mm3 Final   • Neutrophil % 03/14/2023 54.1  42.7 - 76.0 % Final   • Lymphocyte % 03/14/2023 34.4  19.6 - 45.3 % Final   • Monocyte % 03/14/2023 8.8  5.0 - 12.0 % Final   • Eosinophil % 03/14/2023 2.2  0.3 - 6.2 % Final   • Basophil % 03/14/2023 0.4  0.0 - 1.5 % Final   • Immature Grans % 03/14/2023 0.1  0.0 - 0.5 % Final   • Neutrophils, Absolute 03/14/2023 3.87  1.70 - 7.00 10*3/mm3 Final   • Lymphocytes, Absolute 03/14/2023 2.47  0.70 - 3.10 10*3/mm3 Final   • Monocytes, Absolute 03/14/2023 0.63  0.10 - 0.90 10*3/mm3 Final   • Eosinophils, Absolute 03/14/2023 0.16  0.00 - 0.40 10*3/mm3 Final   • Basophils, Absolute 03/14/2023 0.03  0.00 - 0.20 10*3/mm3 Final   • Immature Grans, Absolute 03/14/2023 0.01  0.00 - 0.05 10*3/mm3 Final   • nRBC 03/14/2023 0.0  0.0 - 0.2 /100 WBC Final   • Protime 03/14/2023 14.4 (H)  11.7 - 14.2 Seconds Final   • INR 03/14/2023 1.10  0.90 - 1.10 Final       DIAGNOSTICS:  CT head without contrast 3/13/2023-shows no acute infarct, hemorrhage, contusion.  Right posterior parietal ventriculostomy with catheter is visualized with  catheter tip terminating over the left lateral ventricle as in previous studies.  Lateral and third ventricle size grossly unchanged.  Results Review:   I reviewed the patient's new clinical results.  I personally viewed and interpreted the patient's imaging and labs.  Discussed  imaging with Dr. Pearce    Vital Signs   Temp:  [98.2 °F (36.8 °C)] 98.2 °F (36.8 °C)  Heart Rate:  [] 56  Resp:  [16] 16  BP: (126-178)/(66-93) 131/69    Physical Exam:  Physical Exam  Constitutional:       General: He is awake.      Appearance: Normal appearance.   HENT:      Head: Normocephalic.      Mouth/Throat:      Mouth: Mucous membranes are moist.      Pharynx: Oropharynx is clear.   Eyes:      General: Vision grossly intact.      Pupils: Pupils are equal, round, and reactive to light.   Neurological:      Mental Status: He is alert.      Motor: Motor strength is normal.   Psychiatric:         Attention and Perception: Attention normal.         Mood and Affect: Mood normal.         Speech: Speech normal.      Comments:     Struggles to find words at times, but grossly normal speech       Neurologic Exam     Mental Status   Oriented to person.   Disoriented to place. (Easily reoriented)  Disoriented to date.   Attention: normal. Concentration: normal.   Speech: speech is normal   Level of consciousness: alert  Knowledge: good.   Able to read. Able to repeat.     Cranial Nerves     CN II   Visual fields full to confrontation.     CN III, IV, VI   Pupils are equal, round, and reactive to light.  Right pupil: Size: 3 mm. Shape: regular. Reactivity: brisk. Accommodation: intact.   Left pupil: Size: 3 mm. Shape: regular. Reactivity: brisk. Accommodation: intact.   CN III: no CN III palsy  CN VI: no CN VI palsy  Nystagmus: none     CN V   Facial sensation intact.     CN VII   Facial expression full, symmetric.     CN VIII   CN VIII normal.     CN XII   Fasciculations: absent  Tongue deviation: none    Motor Exam   Muscle bulk: normal  Overall muscle tone: normal  Right leg tone: normal  Left leg tone: normal    Strength   Strength 5/5 throughout.     Sensory Exam   Light touch normal.     Gait, Coordination, and Reflexes     Gait  Gait: (Gait deferred until PT evaluation)      Assessment & Plan         "Generalized weakness    Coronary artery disease involving native coronary artery of native heart without angina pectoris    Essential hypertension    Hyperlipidemia    Normal pressure hydrocephalus (HCC)    KLEBER on CPAP    Hx of CABG    Unsteady gait when walking    Urinary incontinence    Mr. Mae has a history of normal pressure hydrocephalus with symptoms of gait imbalance and urinary incontinence that developed gradually over the past month according to self report.  At this point, his CT head is unremarkable and his current  shunt strata setting is at 1.5 as previously programmed.  No changes were made to shunt. We are awaiting PT and OT evaluation.  His neuro exam is stable. Per nursing, he continues to have urinary incontinence.      PLAN:     Evaluation of shunt complete  PT/OT evaluation pending    I discussed the patient's findings and my recommendations with nursing staff, patient and Dr. Pearce    During patient visit, I utilized appropriate personal protective equipment including mask and gloves.  Mask used was standard procedure mask. Appropriate PPE was worn during the entire visit.  Hand hygiene was completed before and after.     Jai Noriega, APRN  03/14/23  10:42 EDT    \"Dictated utilizing Dragon dictation\".    "

## 2023-03-14 NOTE — H&P
Patient Name:  Sharif Mae  YOB: 1945  MRN:  [id number]  Admit Date:  3/13/2023  Patient Care Team:  Ryan Stephen APRN as PCP - General (Nurse Practitioner)      Subjective   History Present Illness     Chief Complaint   Patient presents with   • Weakness - Generalized   • Fatigue   • Fecal Incontinence   • Urinary Incontinence       Mr. Mae is a 77 y.o. male former smoker with a history of CAD/CABG, hydrocephalus s/p VPS placement, HTN, HLD, KLEBER, RBBB, impaired memory that presents to [facility] complaining of weakness, impaired gait, incontinence. Family is not present, pt is not a good historian. He reports having balance issues recently, denies falls. He does not recall having MICHAEL follow up recently, per Epic last seen in office in September. Family reported to ED that pt has been having similar symptoms associated with NPH, such as incontinence, weakness, trouble walking. He denies HA, chest pain, soa, n/v/d, dysuria, dizziness/lightheadedness.     Afebrile. HR controlled. BP stable. On room air. WBC 6.55, Hgb 14. HS trop 12. Gluc 123, Cr 1.46, ALT 45; remaining chem panel wnl. UA neg. CTH: no acute findings; unchanged since 7/13/22. EKG no change    Review of Systems   Constitutional: Negative for chills and fever.   HENT: Negative for congestion.    Respiratory: Negative for shortness of breath.    Cardiovascular: Negative for chest pain.   Gastrointestinal: Negative for diarrhea, nausea and vomiting.   Genitourinary: Negative for dysuria.   Musculoskeletal: Positive for gait problem.   Skin: Negative for rash.   Neurological: Positive for weakness. Negative for dizziness and light-headedness.   Psychiatric/Behavioral: Negative for sleep disturbance.        Personal History     Past Medical History:   Diagnosis Date   • CAD (coronary artery disease)     s/p CABG   • H/O echocardiogram    • Health care maintenance    • History of EKG 07/21/2015   • Hx of CABG     • Hydrocephalus (HCC)    • Hyperlipidemia    • Hypertension    • Lumbar canal stenosis    • Memory impairment     ON MED   • KLEBER on CPAP     NOT CURRENTLY USING AT THIS TIME   • RBBB (right bundle branch block)    • Spondylisthesis    • Squamous cell carcinoma of skin    • Stroke syndrome    • Unsteady gait when walking    • Unsteady gait when walking      Past Surgical History:   Procedure Laterality Date   • CARDIAC CATHETERIZATION  2004; Showed 90% stenosis in the OM-1, 50% stenosis in the distal left circumflex, 70% stenosis in the Ramus, and 40-50% stenosis in the ditsal RCA.   • CORONARY ARTERY BYPASS GRAFT  2004    LIMA to the LAD, KITTY to the second obtuse marginal, saphenous vein graft to the ramus, and saphenous vein graft to the first obtuse marginal    • KNEE SURGERY Right 2014   •  SHUNT INSERTION N/A 2022    Procedure: VENTRICULAR PERITONEAL SHUNT INSERTION;  Surgeon: Aquiles Pearce MD;  Location: [facility];  Service: Neurosurgery;  Laterality: N/A;     Family History   Problem Relation Age of Onset   • Heart disease Mother    • Heart disease Father    • Hypertension Father    • Stroke Father    • Malig Hyperthermia Neg Hx      Social History     Tobacco Use   • Smoking status: Former     Types: Pipe, Cigars     Quit date: 1987     Years since quittin.0   • Smokeless tobacco: Never   • Tobacco comments:     Currently chews cigars   Vaping Use   • Vaping Use: Never used   Substance Use Topics   • Alcohol use: Yes     Alcohol/week: 2.0 standard drinks     Types: 2 Shots of liquor per week     Comment: 2 bourbon drinks daily   • Drug use: Not Currently     No current facility-administered medications on file prior to encounter.     Current Outpatient Medications on File Prior to Encounter   Medication Sig Dispense Refill   • alfuzosin (UROXATRAL) 10 MG 24 hr tablet Take 1 tablet by mouth every night at bedtime.     • amLODIPine (NORVASC) 5 MG tablet Take  1 tablet by mouth Daily. For blood pressure     • donepezil (ARICEPT) 10 MG tablet Take 1 tablet by mouth Every Night.     • finasteride (PROSCAR) 5 MG tablet Take 1 tablet by mouth Daily.     • memantine (NAMENDA XR) 28 MG capsule sustained-release 24 hr extended release capsule Take  by mouth Daily.     • rosuvastatin (CRESTOR) 10 MG tablet Take 1 tablet by mouth Daily.     • tadalafil 20 MG tablet tablet Take 1 tablet by mouth Daily.     • traZODone (DESYREL) 50 MG tablet Take 1 tablet by mouth Every Night.     • Vortioxetine HBr (Trintellix) 20 MG tablet Take 1 tablet by mouth Daily.     • [DISCONTINUED] atorvastatin (LIPITOR) 80 MG tablet Take 1 tablet by mouth Every Night. 90 tablet 1   • [DISCONTINUED] CIALIS 5 MG tablet Take 1 tablet by mouth Daily As Needed. HOLD PRIOR TO SURG  1   • [DISCONTINUED] Memantine HCl-Donepezil HCl 14-10 MG capsule sustained-release 24 hr Take  by mouth Daily.     • [DISCONTINUED] Vitamin E 400 UNITS tablet Take  by mouth. HOLD PRIOR TO SURG       No Known Allergies    Objective    Objective     Vital Signs  Temp:  [97.6 °F (36.4 °C)-98.2 °F (36.8 °C)] 97.6 °F (36.4 °C)  Heart Rate:  [] 52  Resp:  [16] 16  BP: (126-178)/(66-93) 129/80  SpO2:  [92 %-97 %] 93 %  on   ;   Device (Oxygen Therapy): room air  Body mass index is 36.56 kg/m².    Physical Exam  Vitals and nursing note reviewed.   Constitutional:       General: He is not in acute distress.     Appearance: He is ill-appearing. He is not toxic-appearing.   HENT:      Head: Normocephalic.      Mouth/Throat:      Mouth: Mucous membranes are moist.   Eyes:      Conjunctiva/sclera: Conjunctivae normal.   Cardiovascular:      Rate and Rhythm: Normal rate and regular rhythm.   Pulmonary:      Effort: Pulmonary effort is normal. No respiratory distress.      Breath sounds: No wheezing or rales.   Abdominal:      General: Bowel sounds are normal.      Palpations: Abdomen is soft.   Musculoskeletal:      Cervical back: Neck  supple.      Right lower leg: No edema.      Left lower leg: No edema.   Skin:     General: Skin is warm and dry.   Neurological:      Mental Status: He is alert.      Comments: Generalized weakness   Psychiatric:         Mood and Affect: Mood normal.         Behavior: Behavior normal.         Results Review:  I reviewed the patient's new clinical results.  I reviewed the patient's new imaging results and agree with the interpretation.  I reviewed the patient's other test results and agree with the interpretation  I personally viewed and interpreted the patient's EKG/Telemetry data    Lab Results (last 24 hours)     Procedure Component Value Units Date/Time    CBC & Differential [[id number]]  (Abnormal) Collected: 03/13/23 1855    Specimen: Blood Updated: 03/13/23 1936    Narrative:      The following orders were created for panel order CBC & Differential.  Procedure                               Abnormality         Status                     ---------                               -----------         ------                     CBC Auto Differential[[id number]]        Abnormal            Final result                 Please view results for these tests on the individual orders.    Comprehensive Metabolic Panel [[id number]]  (Abnormal) Collected: 03/13/23 1855    Specimen: Blood Updated: 03/13/23 1955     Glucose 123 mg/dL      BUN 23 mg/dL      Creatinine 1.46 mg/dL      Sodium 142 mmol/L      Potassium 4.1 mmol/L      Chloride 104 mmol/L      CO2 23.7 mmol/L      Calcium 9.5 mg/dL      Total Protein 6.8 g/dL      Albumin 4.4 g/dL      ALT (SGPT) 45 U/L      AST (SGOT) 22 U/L      Alkaline Phosphatase 80 U/L      Total Bilirubin 0.4 mg/dL      Globulin 2.4 gm/dL      A/G Ratio 1.8 g/dL      BUN/Creatinine Ratio 15.8     Anion Gap 14.3 mmol/L      eGFR 48.6 mL/min/1.73     Narrative:      GFR Normal >60  Chronic Kidney Disease <60  Kidney Failure <15    The GFR formula is only valid for adults with stable renal function  between ages 18 and 70.    Single High Sensitivity Troponin T [[id number]]  (Normal) Collected: 03/13/23 1855    Specimen: Blood Updated: 03/13/23 1955     HS Troponin T 12 ng/L     Narrative:      High Sensitive Troponin T Reference Range:  <10.0 ng/L- Negative Female for AMI  <15.0 ng/L- Negative Male for AMI  >=10 - Abnormal Female indicating possible myocardial injury.  >=15 - Abnormal Male indicating possible myocardial injury.   Clinicians would have to utilize clinical acumen, EKG, Troponin, and serial changes to determine if it is an Acute Myocardial Infarction or myocardial injury due to an underlying chronic condition.         CBC Auto Differential [[id number]]  (Abnormal) Collected: 03/13/23 1855    Specimen: Blood Updated: 03/13/23 1936     WBC 6.55 10*3/mm3      RBC 4.17 10*6/mm3      Hemoglobin 14.0 g/dL      Hematocrit 40.3 %      MCV 96.6 fL      MCH 33.6 pg      MCHC 34.7 g/dL      RDW 12.8 %      RDW-SD 45.5 fl      MPV 10.9 fL      Platelets 167 10*3/mm3      Neutrophil % 59.7 %      Lymphocyte % 29.3 %      Monocyte % 8.5 %      Eosinophil % 1.8 %      Basophil % 0.5 %      Immature Grans % 0.2 %      Neutrophils, Absolute 3.91 10*3/mm3      Lymphocytes, Absolute 1.92 10*3/mm3      Monocytes, Absolute 0.56 10*3/mm3      Eosinophils, Absolute 0.12 10*3/mm3      Basophils, Absolute 0.03 10*3/mm3      Immature Grans, Absolute 0.01 10*3/mm3      nRBC 0.0 /100 WBC     Urinalysis With Microscopic If Indicated (No Culture) - Urine, Clean Catch [[id number]]  (Normal) Collected: 03/13/23 1922    Specimen: Urine, Clean Catch Updated: 03/13/23 1936     Color, UA Yellow     Appearance, UA Clear     pH, UA <=5.0     Specific Gravity, UA 1.023     Glucose, UA Negative     Ketones, UA Negative     Bilirubin, UA Negative     Blood, UA Negative     Protein, UA Negative     Leuk Esterase, UA Negative     Nitrite, UA Negative     Urobilinogen, UA 0.2 E.U./dL    Narrative:      Urine microscopic not indicated.    CBC  Auto Differential [[id number]]  (Abnormal) Collected: 03/14/23 0616    Specimen: Blood Updated: 03/14/23 0634     WBC 7.17 10*3/mm3      RBC 3.92 10*6/mm3      Hemoglobin 13.0 g/dL      Hematocrit 38.4 %      MCV 98.0 fL      MCH 33.2 pg      MCHC 33.9 g/dL      RDW 13.1 %      RDW-SD 45.4 fl      MPV 10.7 fL      Platelets 149 10*3/mm3      Neutrophil % 54.1 %      Lymphocyte % 34.4 %      Monocyte % 8.8 %      Eosinophil % 2.2 %      Basophil % 0.4 %      Immature Grans % 0.1 %      Neutrophils, Absolute 3.87 10*3/mm3      Lymphocytes, Absolute 2.47 10*3/mm3      Monocytes, Absolute 0.63 10*3/mm3      Eosinophils, Absolute 0.16 10*3/mm3      Basophils, Absolute 0.03 10*3/mm3      Immature Grans, Absolute 0.01 10*3/mm3      nRBC 0.0 /100 WBC     Protime-INR [[id number]]  (Abnormal) Collected: 03/14/23 0616    Specimen: Blood Updated: 03/14/23 0638     Protime 14.4 Seconds      INR 1.10    Basic Metabolic Panel [[id number]]  (Abnormal) Collected: 03/14/23 0617    Specimen: Blood Updated: 03/14/23 0651     Glucose 111 mg/dL      BUN 22 mg/dL      Creatinine 1.29 mg/dL      Sodium 143 mmol/L      Potassium 3.7 mmol/L      Chloride 108 mmol/L      CO2 24.3 mmol/L      Calcium 9.5 mg/dL      BUN/Creatinine Ratio 17.1     Anion Gap 10.7 mmol/L      eGFR 56.4 mL/min/1.73     Narrative:      GFR Normal >60  Chronic Kidney Disease <60  Kidney Failure <15    The GFR formula is only valid for adults with stable renal function between ages 18 and 70.          Imaging Results (Last 24 Hours)     Procedure Component Value Units Date/Time    CT Head Without Contrast [[id number]] Collected: 03/13/23 2003     Updated: 03/13/23 2015    Narrative:      CT HEAD     HISTORY:Dizziness, trouble walking, history of  shunt     TECHNIQUE: CT scan of the head was obtained with 3 mm axial images  without intravenous contrast.  Radiation dose reduction techniques were  utilized, including automated exposure control and exposure modulation  based  on body size.     COMPARISON:Multiple head CTs dating back to 05/19/2022     FINDINGS:  There is no finding of acute infarct, hemorrhage, contusion or abnormal  extra-axial collection. A right posterior parietal approach  ventriculostomy catheter is present with tip terminating over the left  lateral ventricle, grossly unchanged in overall configuration since  07/13/2022. The degree of dilation of the lateral and third ventricles  is grossly unchanged since 07/13/2022.       Impression:      1.  No noncontrast CT findings of acute intracranial abnormality. Right  posterior parietal approach ventriculostomy catheter tip terminating  over the left lateral ventricle, as before. The degree of dilation of  the lateral and third ventricles is grossly unchanged since 07/13/2022.        This report was finalized on 3/13/2023 8:12 PM by Dr. Nick Chakraborty M.D.             Results for orders placed during the hospital encounter of 10/28/21    Adult Transthoracic Echo Complete W/ Cont if Necessary Per Protocol    Interpretation Summary  · Calculated left ventricular EF = 66% Estimated left ventricular EF was in agreement with the calculated left ventricular EF. Left ventricular systolic function is normal.  · Left ventricular diastolic function was normal.  · Estimated right ventricular systolic pressure from tricuspid regurgitation is normal (<35 mmHg).  · Sclerotic aortic valve noted without any significant stenosis or regurgitation.      ECG 12 Lead Other; Weakness   Final Result   HEART RATE= 60  bpm   RR Interval= 1000  ms   CO Interval= 151  ms   P Horizontal Axis= 11  deg   P Front Axis= 42  deg   QRSD Interval= 113  ms   QT Interval= 432  ms   QRS Axis= 64  deg   T Wave Axis= 37  deg   - ABNORMAL ECG -   Sinus rhythm   Borderline intraventricular conduction delay   Low voltage, precordial leads   No change from previous tracing   Electronically Signed By: Drake Lindsay ([facility]) 13-Mar-2023 20:42:08   Date and Time  of Study: 2023-03-13 18:53:47           Assessment/Plan     Active Hospital Problems    Diagnosis  POA   • **Generalized weakness [R53.1]  Yes   • Alcohol use [Z78.9]  Yes   • Hx of CABG [Z95.1]  Not Applicable   • Unsteady gait when walking [R26.81]  Yes   • Urinary incontinence [R32]  Yes   • KLEBER on CPAP [G47.33, Z99.89]  Not Applicable   • Normal pressure hydrocephalus (HCC) [G91.2]  Yes   • Hyperlipidemia [E78.5]  Yes   • Essential hypertension [I10]  Yes   • Coronary artery disease involving native coronary artery of native heart without angina pectoris [I25.10]  Yes      Resolved Hospital Problems   No resolved problems to display.       Mr. Mae is a 77 y.o. male former smoker with a history of CAD/CABG, hydrocephalus s/p VPS placement, HTN, HLD, KLEBER, RBBB, impaired memory who is admitted for impaired gait/balance, urinary incontinence, weakness    Weakness/impaired gait/urinary incontinence/NPH s/p VPS:  -No changes on CTH. UA unremarkable. MICHAEL consulted to evaluate; last seen 9/2022. OT/PT eval    HTN:  - BP acceptable acutely. Continue amlodipine    CAD/CABG:  -Denies chest pain, CHF symptoms. EKG SR, no change. Echo 2021 EF 66%, LV diastolic normal function    KLEBER:  -Monitor on oximetry. Use home unit if available    Alcohol use:  -Per nursing, reported pt drinks 2 glasses bourbon/night. CIWA protocol ordered. No documented hx of withdrawal. Monitor      · I discussed the patient's findings and my recommendations with patient and nursing staff.    VTE Prophylaxis - SCDs.  Code Status - Full code.       FRANSISCO Cavanaugh  [facility]  03/14/23  11:57 EDT

## 2023-03-14 NOTE — PLAN OF CARE
Goal Outcome Evaluation:   VSS. Alert to self and place. Admitted for generalized weakness. Up with assist x1 to bathroom. Page placed to [facility] for admitting orders. Call light in reach, bed alarm on.

## 2023-03-14 NOTE — ED NOTES
Nursing report ED to floor  Sharif Mae  77 y.o.  male    HPI :   Chief Complaint   Patient presents with    Weakness - Generalized    Fatigue    Fecal Incontinence    Urinary Incontinence       Admitting doctor:   Roverto Purdy MD    Admitting diagnosis:   The primary encounter diagnosis was Generalized weakness. Diagnoses of Gait disturbance, Urinary incontinence, unspecified type, and Acute renal insufficiency were also pertinent to this visit.    Code status:   Current Code Status       Date Active Code Status Order ID Comments User Context       Prior            Allergies:   Patient has no known allergies.    Isolation:   No active isolations    Intake and Output  No intake or output data in the 24 hours ending 03/14/23 0100    Weight:       03/13/23  1751   Weight: 102 kg (224 lb)       Most recent vitals:   Vitals:    03/13/23 2000 03/13/23 2030 03/13/23 2102 03/13/23 2132   BP: 141/79 154/77 143/93 160/86   Pulse: 58 54 64 59   Resp:       Temp:       TempSrc:       SpO2: 92% 96% 95% 96%   Weight:       Height:           Active LDAs/IV Access:   Lines, Drains & Airways       Active LDAs       Name Placement date Placement time Site Days    Peripheral IV 03/13/23 1844 Left Antecubital 03/13/23  1844  Antecubital  less than 1                    Labs (abnormal labs have a star):   Labs Reviewed   COMPREHENSIVE METABOLIC PANEL - Abnormal; Notable for the following components:       Result Value    Glucose 123 (*)     Creatinine 1.46 (*)     ALT (SGPT) 45 (*)     eGFR 48.6 (*)     All other components within normal limits    Narrative:     GFR Normal >60  Chronic Kidney Disease <60  Kidney Failure <15    The GFR formula is only valid for adults with stable renal function between ages 18 and 70.   CBC WITH AUTO DIFFERENTIAL - Abnormal; Notable for the following components:    MCH 33.6 (*)     All other components within normal limits   URINALYSIS W/ MICROSCOPIC IF INDICATED (NO CULTURE) - Normal     Narrative:     Urine microscopic not indicated.   SINGLE HSTROPONIN T - Normal    Narrative:     High Sensitive Troponin T Reference Range:  <10.0 ng/L- Negative Female for AMI  <15.0 ng/L- Negative Male for AMI  >=10 - Abnormal Female indicating possible myocardial injury.  >=15 - Abnormal Male indicating possible myocardial injury.   Clinicians would have to utilize clinical acumen, EKG, Troponin, and serial changes to determine if it is an Acute Myocardial Infarction or myocardial injury due to an underlying chronic condition.        CBC AND DIFFERENTIAL    Narrative:     The following orders were created for panel order CBC & Differential.  Procedure                               Abnormality         Status                     ---------                               -----------         ------                     CBC Auto Differential[[id number]]        Abnormal            Final result                 Please view results for these tests on the individual orders.       EKG:   ECG 12 Lead Other; Weakness   Final Result   HEART RATE= 60  bpm   RR Interval= 1000  ms   SD Interval= 151  ms   P Horizontal Axis= 11  deg   P Front Axis= 42  deg   QRSD Interval= 113  ms   QT Interval= 432  ms   QRS Axis= 64  deg   T Wave Axis= 37  deg   - ABNORMAL ECG -   Sinus rhythm   Borderline intraventricular conduction delay   Low voltage, precordial leads   No change from previous tracing   Electronically Signed By: Drake Lindsay ([facility]) 13-Mar-2023 20:42:08   Date and Time of Study: 2023-03-13 18:53:47          Meds given in ED:   Medications   sodium chloride 0.9 % flush 10 mL (has no administration in time range)       Imaging results:  CT Head Without Contrast    Result Date: 3/13/2023  1.  No noncontrast CT findings of acute intracranial abnormality. Right posterior parietal approach ventriculostomy catheter tip terminating over the left lateral ventricle, as before. The degree of dilation of the lateral and third ventricles  is grossly unchanged since 2022.   This report was finalized on 3/13/2023 8:12 PM by Dr. Nick Chakraborty M.D.       Ambulatory status:   - assist x1    Social issues:   Social History     Socioeconomic History    Marital status:    Tobacco Use    Smoking status: Former     Types: Pipe     Quit date: 1987     Years since quittin.0    Smokeless tobacco: Never   Vaping Use    Vaping Use: Never used   Substance and Sexual Activity    Alcohol use: Yes     Alcohol/week: 2.0 standard drinks     Types: 2 Shots of liquor per week     Comment: 1 DRINK DAY    Drug use: Not Currently    Sexual activity: Yes     Partners: Female       NIH Stroke Scale:  Interval: baseline      Reta Rivera RN  23 01:00 EDT

## 2023-03-14 NOTE — PLAN OF CARE
Problem: Adult Inpatient Plan of Care  Goal: Plan of Care Review  Outcome: Ongoing, Progressing  Flowsheets (Taken 3/14/2023 1511)  Progress: no change  Plan of Care Reviewed With: patient   Goal Outcome Evaluation:  Plan of Care Reviewed With: patient        Progress: no change       Pt has been pleasantly confused today. Home medications have been restarted. Pt is stilling have urinary and bowel incontinence. PT/OT ordered but have yet to see the pt. Pt is on room air. VSS. Discharge in the next few days.

## 2023-03-14 NOTE — ED PROVIDER NOTES
EMERGENCY DEPARTMENT ENCOUNTER    CHIEF COMPLAINT  Chief Complaint: Gait disturbance  History given by: Patient/spouse at bedside  History limited by: Patient is a poor historian  Room Number: 24/24  PMD: Ryan Stephen APRN  Neurosurgeon: Dr. Sridhar Mayorga    HPI:  Pt is a 77 y.o. male presents from home with report of generalized weakness worsening over the past several weeks.  Patient and family report he has had increasing unsteadiness of his gait which patient attributes to weakness of his legs.  Patient reports 2-week history of worsening urinary incontinence.  Patient unable to state whether or not he has the urge to urinate before his incontinent episodes.  Family reports 1 episode of incontinence of stool 2 days prior to arrival.  Family reports very similar symptoms prior to new diagnosis of normal pressure hydrocephalus in January 2022 at which time patient had a  shunt placed by Dr. Sridhar Mayorga.  Patient denies cough, congestion, headache, chest pain, shortness of air, abdominal pain, nausea/vomiting, new visual disturbance, new speech disturbance, unilateral weakness or numbness.  Patient and family reports he ambulates independently at his baseline without assist.  Family reports patient has had trouble with short-term memory deficit over the past year and a half.  Spouse reports this memory difficulty is no different than usual for the patient recently.  Family denies recent falls    Aggravating Factors: Unknown  Alleviating Factors: Nothing  Treatment before arrival: Regular meds  Chronic or social conditions impacting care: Patient with normal pressure hydrocephalus, obstructive sleep apnea    Additional sources:  Discussed/ obtained information from independent historians: Spouse at bedside    External (non-ED) record review:   Patient with a history of coronary artery disease, obstructive sleep apnea, with ventricular to peritoneal shunt placed 5/18/2022        PAST MEDICAL HISTORY  Active  Ambulatory Problems     Diagnosis Date Noted   • Coronary artery disease involving native coronary artery of native heart without angina pectoris 11/30/2016   • Essential hypertension 11/30/2016   • Hyperlipidemia 11/30/2016   • RBBB (right bundle branch block)    • Normal pressure hydrocephalus (HCC) 04/12/2022   • KLEBER on CPAP    • BPH (benign prostatic hyperplasia)      Resolved Ambulatory Problems     Diagnosis Date Noted   • No Resolved Ambulatory Problems     Past Medical History:   Diagnosis Date   • CAD (coronary artery disease)    • H/O echocardiogram    • Health care maintenance    • History of EKG 07/21/2015   • Hx of CABG    • Hydrocephalus (HCC)    • Hypertension    • Lumbar canal stenosis    • Memory impairment    • Spondylisthesis    • Squamous cell carcinoma of skin    • Stroke syndrome    • Unsteady gait when walking    • Unsteady gait when walking        PAST SURGICAL HISTORY  Past Surgical History:   Procedure Laterality Date   • CARDIAC CATHETERIZATION  12/24/2004 12/2004; Showed 90% stenosis in the OM-1, 50% stenosis in the distal left circumflex, 70% stenosis in the Ramus, and 40-50% stenosis in the ditsal RCA.   • CORONARY ARTERY BYPASS GRAFT  12/2004    LIMA to the LAD, KITTY to the second obtuse marginal, saphenous vein graft to the ramus, and saphenous vein graft to the first obtuse marginal    • KNEE SURGERY Right 09/14/2014   •  SHUNT INSERTION N/A 5/18/2022    Procedure: VENTRICULAR PERITONEAL SHUNT INSERTION;  Surgeon: Aquiles Pearce MD;  Location: [facility];  Service: Neurosurgery;  Laterality: N/A;       FAMILY HISTORY  Family History   Problem Relation Age of Onset   • Heart disease Mother    • Heart disease Father    • Hypertension Father    • Stroke Father    • Malig Hyperthermia Neg Hx        SOCIAL HISTORY  Social History     Socioeconomic History   • Marital status:    Tobacco Use   • Smoking status: Former     Types: Pipe     Quit date: 2/18/1987     Years  since quittin.0   • Smokeless tobacco: Never   Vaping Use   • Vaping Use: Never used   Substance and Sexual Activity   • Alcohol use: Yes     Alcohol/week: 2.0 standard drinks     Types: 2 Shots of liquor per week     Comment: 1 DRINK DAY   • Drug use: Not Currently   • Sexual activity: Yes     Partners: Female       ALLERGIES  Patient has no known allergies.    REVIEW OF SYSTEMS  Review of Systems    PHYSICAL EXAM  ED Triage Vitals   Temp Heart Rate Resp BP SpO2   23 1751 23 1751 23 1751 23 1759 23   98.2 °F (36.8 °C) 101 16 142/81 96 %      Temp src Heart Rate Source Patient Position BP Location FiO2 (%)   23 1751 23 -- -- --   Tympanic Monitor          Physical Exam  Vitals and nursing note reviewed.   Constitutional:       General: He is in acute distress.   HENT:      Head: Normocephalic and atraumatic.   Cardiovascular:      Rate and Rhythm: Normal rate and regular rhythm.      Pulses:           Posterior tibial pulses are 2+ on the right side and 2+ on the left side.      Heart sounds: Normal heart sounds. No murmur heard.  Pulmonary:      Effort: Pulmonary effort is normal. No respiratory distress.      Breath sounds: Normal breath sounds. No wheezing.   Abdominal:      General: Bowel sounds are normal.      Palpations: Abdomen is soft.      Tenderness: There is no abdominal tenderness. There is no guarding or rebound.   Musculoskeletal:         General: Normal range of motion.      Cervical back: Normal range of motion.   Skin:     General: Skin is warm and dry.   Neurological:      Mental Status: He is alert and oriented to person, place, and time.      GCS: GCS eye subscore is 4. GCS verbal subscore is 5. GCS motor subscore is 6.      Cranial Nerves: Cranial nerves 2-12 are intact.      Sensory: Sensation is intact.      Motor: Motor function is intact.      Coordination: Romberg sign negative. Rapid alternating movements normal.      Comments:  Patient requires assist of 1 to ensure safety while ambulating   Psychiatric:         Mood and Affect: Affect normal.         LAB RESULTS  Recent Results (from the past 24 hour(s))   ECG 12 Lead Other; Weakness    Collection Time: 03/13/23  6:53 PM   Result Value Ref Range    QT Interval 432 ms   Comprehensive Metabolic Panel    Collection Time: 03/13/23  6:55 PM    Specimen: Blood   Result Value Ref Range    Glucose 123 (H) 65 - 99 mg/dL    BUN 23 8 - 23 mg/dL    Creatinine 1.46 (H) 0.76 - 1.27 mg/dL    Sodium 142 136 - 145 mmol/L    Potassium 4.1 3.5 - 5.2 mmol/L    Chloride 104 98 - 107 mmol/L    CO2 23.7 22.0 - 29.0 mmol/L    Calcium 9.5 8.6 - 10.5 mg/dL    Total Protein 6.8 6.0 - 8.5 g/dL    Albumin 4.4 3.5 - 5.2 g/dL    ALT (SGPT) 45 (H) 1 - 41 U/L    AST (SGOT) 22 1 - 40 U/L    Alkaline Phosphatase 80 39 - 117 U/L    Total Bilirubin 0.4 0.0 - 1.2 mg/dL    Globulin 2.4 gm/dL    A/G Ratio 1.8 g/dL    BUN/Creatinine Ratio 15.8 7.0 - 25.0    Anion Gap 14.3 5.0 - 15.0 mmol/L    eGFR 48.6 (L) >60.0 mL/min/1.73   Single High Sensitivity Troponin T    Collection Time: 03/13/23  6:55 PM    Specimen: Blood   Result Value Ref Range    HS Troponin T 12 <15 ng/L   CBC Auto Differential    Collection Time: 03/13/23  6:55 PM    Specimen: Blood   Result Value Ref Range    WBC 6.55 3.40 - 10.80 10*3/mm3    RBC 4.17 4.14 - 5.80 10*6/mm3    Hemoglobin 14.0 13.0 - 17.7 g/dL    Hematocrit 40.3 37.5 - 51.0 %    MCV 96.6 79.0 - 97.0 fL    MCH 33.6 (H) 26.6 - 33.0 pg    MCHC 34.7 31.5 - 35.7 g/dL    RDW 12.8 12.3 - 15.4 %    RDW-SD 45.5 37.0 - 54.0 fl    MPV 10.9 6.0 - 12.0 fL    Platelets 167 140 - 450 10*3/mm3    Neutrophil % 59.7 42.7 - 76.0 %    Lymphocyte % 29.3 19.6 - 45.3 %    Monocyte % 8.5 5.0 - 12.0 %    Eosinophil % 1.8 0.3 - 6.2 %    Basophil % 0.5 0.0 - 1.5 %    Immature Grans % 0.2 0.0 - 0.5 %    Neutrophils, Absolute 3.91 1.70 - 7.00 10*3/mm3    Lymphocytes, Absolute 1.92 0.70 - 3.10 10*3/mm3    Monocytes, Absolute  0.56 0.10 - 0.90 10*3/mm3    Eosinophils, Absolute 0.12 0.00 - 0.40 10*3/mm3    Basophils, Absolute 0.03 0.00 - 0.20 10*3/mm3    Immature Grans, Absolute 0.01 0.00 - 0.05 10*3/mm3    nRBC 0.0 0.0 - 0.2 /100 WBC   Urinalysis With Microscopic If Indicated (No Culture) - Urine, Clean Catch    Collection Time: 03/13/23  7:22 PM    Specimen: Urine, Clean Catch   Result Value Ref Range    Color, UA Yellow Yellow, Straw    Appearance, UA Clear Clear    pH, UA <=5.0 5.0 - 8.0    Specific Gravity, UA 1.023 1.005 - 1.030    Glucose, UA Negative Negative    Ketones, UA Negative Negative    Bilirubin, UA Negative Negative    Blood, UA Negative Negative    Protein, UA Negative Negative    Leuk Esterase, UA Negative Negative    Nitrite, UA Negative Negative    Urobilinogen, UA 0.2 E.U./dL 0.2 - 1.0 E.U./dL       I ordered the above labs and reviewed the results    RADIOLOGY  CT Head Without Contrast    Result Date: 3/13/2023  CT HEAD  HISTORY:Dizziness, trouble walking, history of  shunt  TECHNIQUE: CT scan of the head was obtained with 3 mm axial images without intravenous contrast.  Radiation dose reduction techniques were utilized, including automated exposure control and exposure modulation based on body size.  COMPARISON:Multiple head CTs dating back to 05/19/2022  FINDINGS: There is no finding of acute infarct, hemorrhage, contusion or abnormal extra-axial collection. A right posterior parietal approach ventriculostomy catheter is present with tip terminating over the left lateral ventricle, grossly unchanged in overall configuration since 07/13/2022. The degree of dilation of the lateral and third ventricles is grossly unchanged since 07/13/2022.      1.  No noncontrast CT findings of acute intracranial abnormality. Right posterior parietal approach ventriculostomy catheter tip terminating over the left lateral ventricle, as before. The degree of dilation of the lateral and third ventricles is grossly unchanged since  07/13/2022.   This report was finalized on 3/13/2023 8:12 PM by Dr. Nick Chakraborty M.D.        I ordered the above noted radiological studies. Interpreted by radiologist. Viewed and interpreted by me in PACS -no obvious large amount of blood or midline shift      PROCEDURES  Procedures      MEDICATIONS GIVEN IN THE EMERGENCY DEPARTMENT  Medications   sodium chloride 0.9 % flush 10 mL (has no administration in time range)           MEDICAL DECISION MAKING  Results were reviewed/discussed with the patient and they were also made aware of online access. Pt also made aware that some labs, such as cultures, will not be resulted during ER visit and followup with PMD is necessary.   EKGs and labs independently viewed and interpreted by me.  Discussion below represents my analysis of pertinent findings related to  condition, differential diagnosis, treatment plan and final disposition.    Differential diagnosis for generalized weakness includes but is not limited to:  Neuromuscular weakness   CVA  Hemorrhagic stroke  Multiple sclerosis  Spinal cord disease: Infection (Epidural abscess)  Infarction/ischemia  Trauma (Spinal Cord Syndromes)  Toxins   Non-neuromuscular weakness   ACS  Arrhythmia/Syncope  Severe infection/Sepsis  Hypoglycemia  Symptomatic Anemia  Severe dehydration  Hypothyroidism  Polypharmacy  Malignancy      Independent interpretation of labs, radiology studies, and discussions with consultants:  ED Course as of 03/14/23 0426   Mon Mar 13, 2023   2316 RN reports patient needed assist of 1 to ambulate [TO]   2347 Discussed with FRANSISCO Weber on-call for [facility] who is aware patient presentation, difficulty with ambulating independently, imaging, labs and agrees to admit to Dr. Philippe Purdy for further testing, treatment as needed. [TO]      ED Course User Index  [TO] Trisha Sagastume MD     EKG          EKG time: 653  Rhythm/Rate: Sinus rhythm, rate in the 60s  P waves and DE: Normal P waves, normal CARLOS's  QRS, axis:  NS IVCD  ST and T waves: Nonspecific ST/T wave findings    Interpreted Contemporaneously by me, independently viewed  Minimally changed compared to prior 5/17/2022          MDM       DIAGNOSIS  Final diagnoses:   Generalized weakness   Gait disturbance   Urinary incontinence, unspecified type   Acute renal insufficiency       DISPOSITION  ADMISSION    Discussed treatment plan and reason for admission with pt/family and admitting physician.  Pt/family voiced understanding of the plan for admission for further testing/treatment as needed.         Latest Documented Vital Signs:  As of 23:49 EDT  BP- 160/86 HR- 59 Temp- 98.2 °F (36.8 °C) (Tympanic) O2 sat- 96%    --  Full protective equipment was worn throughout this patient encounter including a face mask, eye protection and gloves. Hand hygiene was performed before donning protective equipment and after removal when leaving the room.     Please note that portions of this note were completed with a voice recognition program.       Note Disclaimer: At [facility], we believe that sharing information builds trust and better relationships. You are receiving this note because you are receiving care at [facility] or recently visited. It is possible you will see health information before a provider has talked with you about it. This kind of information can be easy to misunderstand. To help you fully understand what it means for your health, we urge you to discuss this note with your provider.     Trisha Sagastume MD  03/14/23 0422

## 2023-03-15 ENCOUNTER — TELEPHONE (OUTPATIENT)
Dept: NEUROSURGERY | Facility: CLINIC | Age: 78
End: 2023-03-15

## 2023-03-15 LAB
ANION GAP SERPL CALCULATED.3IONS-SCNC: 13 MMOL/L (ref 5–15)
BUN SERPL-MCNC: 28 MG/DL (ref 8–23)
BUN/CREAT SERPL: 19.2 (ref 7–25)
CALCIUM SPEC-SCNC: 9 MG/DL (ref 8.6–10.5)
CHLORIDE SERPL-SCNC: 108 MMOL/L (ref 98–107)
CO2 SERPL-SCNC: 23 MMOL/L (ref 22–29)
CREAT SERPL-MCNC: 1.46 MG/DL (ref 0.76–1.27)
DEPRECATED RDW RBC AUTO: 46.5 FL (ref 37–54)
EGFRCR SERPLBLD CKD-EPI 2021: 48.6 ML/MIN/1.73
ERYTHROCYTE [DISTWIDTH] IN BLOOD BY AUTOMATED COUNT: 12.8 % (ref 12.3–15.4)
FOLATE SERPL-MCNC: 11.4 NG/ML (ref 4.78–24.2)
GLUCOSE SERPL-MCNC: 104 MG/DL (ref 65–99)
HCT VFR BLD AUTO: 39.3 % (ref 37.5–51)
HGB BLD-MCNC: 13.5 G/DL (ref 13–17.7)
MCH RBC QN AUTO: 34.3 PG (ref 26.6–33)
MCHC RBC AUTO-ENTMCNC: 34.4 G/DL (ref 31.5–35.7)
MCV RBC AUTO: 99.7 FL (ref 79–97)
PLATELET # BLD AUTO: 157 10*3/MM3 (ref 140–450)
PMV BLD AUTO: 11.1 FL (ref 6–12)
POTASSIUM SERPL-SCNC: 3.7 MMOL/L (ref 3.5–5.2)
RBC # BLD AUTO: 3.94 10*6/MM3 (ref 4.14–5.8)
SODIUM SERPL-SCNC: 144 MMOL/L (ref 136–145)
VIT B12 BLD-MCNC: 384 PG/ML (ref 211–946)
WBC NRBC COR # BLD: 7.72 10*3/MM3 (ref 3.4–10.8)

## 2023-03-15 PROCEDURE — 97535 SELF CARE MNGMENT TRAINING: CPT

## 2023-03-15 PROCEDURE — 96372 THER/PROPH/DIAG INJ SC/IM: CPT

## 2023-03-15 PROCEDURE — G0378 HOSPITAL OBSERVATION PER HR: HCPCS

## 2023-03-15 PROCEDURE — 97166 OT EVAL MOD COMPLEX 45 MIN: CPT

## 2023-03-15 PROCEDURE — 85027 COMPLETE CBC AUTOMATED: CPT | Performed by: INTERNAL MEDICINE

## 2023-03-15 PROCEDURE — 82746 ASSAY OF FOLIC ACID SERUM: CPT | Performed by: INTERNAL MEDICINE

## 2023-03-15 PROCEDURE — 25010000002 CYANOCOBALAMIN PER 1000 MCG: Performed by: INTERNAL MEDICINE

## 2023-03-15 PROCEDURE — 82607 VITAMIN B-12: CPT | Performed by: INTERNAL MEDICINE

## 2023-03-15 PROCEDURE — 97116 GAIT TRAINING THERAPY: CPT

## 2023-03-15 PROCEDURE — 97530 THERAPEUTIC ACTIVITIES: CPT

## 2023-03-15 PROCEDURE — 97162 PT EVAL MOD COMPLEX 30 MIN: CPT

## 2023-03-15 PROCEDURE — 80048 BASIC METABOLIC PNL TOTAL CA: CPT | Performed by: INTERNAL MEDICINE

## 2023-03-15 PROCEDURE — 99231 SBSQ HOSP IP/OBS SF/LOW 25: CPT

## 2023-03-15 RX ORDER — CHOLECALCIFEROL (VITAMIN D3) 125 MCG
1000 CAPSULE ORAL DAILY
Status: DISCONTINUED | OUTPATIENT
Start: 2023-03-18 | End: 2023-03-16 | Stop reason: HOSPADM

## 2023-03-15 RX ORDER — CYANOCOBALAMIN 1000 UG/ML
1000 INJECTION, SOLUTION INTRAMUSCULAR; SUBCUTANEOUS DAILY
Status: DISCONTINUED | OUTPATIENT
Start: 2023-03-15 | End: 2023-03-16 | Stop reason: HOSPADM

## 2023-03-15 RX ADMIN — TRAZODONE HYDROCHLORIDE 50 MG: 50 TABLET ORAL at 20:20

## 2023-03-15 RX ADMIN — FINASTERIDE 5 MG: 5 TABLET, FILM COATED ORAL at 08:37

## 2023-03-15 RX ADMIN — MEMANTINE HYDROCHLORIDE 10 MG: 10 TABLET, FILM COATED ORAL at 08:36

## 2023-03-15 RX ADMIN — VORTIOXETINE 20 MG: 5 TABLET, FILM COATED ORAL at 08:36

## 2023-03-15 RX ADMIN — MEMANTINE HYDROCHLORIDE 10 MG: 10 TABLET, FILM COATED ORAL at 20:20

## 2023-03-15 RX ADMIN — CYANOCOBALAMIN 1000 MCG: 1000 INJECTION, SOLUTION INTRAMUSCULAR; SUBCUTANEOUS at 12:28

## 2023-03-15 RX ADMIN — FOLIC ACID 1 MG: 1 TABLET ORAL at 08:36

## 2023-03-15 RX ADMIN — Medication 100 MG: at 08:36

## 2023-03-15 RX ADMIN — TADALAFIL 20 MG: 5 TABLET, FILM COATED ORAL at 08:36

## 2023-03-15 RX ADMIN — TAMSULOSIN HYDROCHLORIDE 0.4 MG: 0.4 CAPSULE ORAL at 20:20

## 2023-03-15 RX ADMIN — DONEPEZIL HYDROCHLORIDE 10 MG: 10 TABLET, FILM COATED ORAL at 20:20

## 2023-03-15 RX ADMIN — Medication 10 ML: at 08:37

## 2023-03-15 RX ADMIN — AMLODIPINE BESYLATE 5 MG: 5 TABLET ORAL at 08:36

## 2023-03-15 RX ADMIN — Medication 1 TABLET: at 08:36

## 2023-03-15 RX ADMIN — Medication 10 ML: at 20:20

## 2023-03-15 RX ADMIN — ROSUVASTATIN CALCIUM 10 MG: 10 TABLET, FILM COATED ORAL at 08:36

## 2023-03-15 NOTE — PROGRESS NOTES
Name: Sharif Mae ADMIT: 3/13/2023   : 1945  PCP: Ryan Stephen, FRANSISCO    MRN: [id number] LOS: 0 days   AGE/SEX: 77 y.o. male  ROOM: [facility]     Subjective   Subjective   Up in chair when seen. Mild confusion, pleasant. No complaints    Review of Systems   Constitutional: Negative for fever.   HENT: Negative for congestion.    Respiratory: Negative for shortness of breath.    Cardiovascular: Negative for chest pain.   Gastrointestinal: Negative for nausea.   Genitourinary: Negative for difficulty urinating.   Musculoskeletal: Negative for arthralgias.   Skin: Negative for rash.   Neurological: Negative for headaches.   Psychiatric/Behavioral: Negative for sleep disturbance.        Objective   Objective   Vital Signs  Temp:  [97.4 °F (36.3 °C)-98.1 °F (36.7 °C)] 97.4 °F (36.3 °C)  Heart Rate:  [54-65] 58  Resp:  [16] 16  BP: (117-131)/(61-74) 125/72  SpO2:  [92 %-99 %] 92 %  on   ;   Device (Oxygen Therapy): room air  Body mass index is 36.56 kg/m².  Physical Exam  Vitals and nursing note reviewed.   Constitutional:       General: He is not in acute distress.     Appearance: He is obese.   HENT:      Head: Normocephalic.      Mouth/Throat:      Mouth: Mucous membranes are moist.   Eyes:      Conjunctiva/sclera: Conjunctivae normal.   Cardiovascular:      Rate and Rhythm: Normal rate and regular rhythm.   Pulmonary:      Effort: Pulmonary effort is normal. No respiratory distress.      Breath sounds: No wheezing or rales.   Abdominal:      General: Bowel sounds are normal.      Palpations: Abdomen is soft.   Musculoskeletal:      Cervical back: Neck supple.      Right lower leg: No edema.      Left lower leg: No edema.   Skin:     General: Skin is warm and dry.   Neurological:      Mental Status: He is alert.   Psychiatric:         Behavior: Behavior is cooperative.         Cognition and Memory: Memory is impaired.       Results Review     I reviewed the patient's new clinical results.  Results from  last 7 days   Lab Units 03/15/23  0417 03/14/23  0616 03/13/23  1855   WBC 10*3/mm3 7.72 7.17 6.55   HEMOGLOBIN g/dL 13.5 13.0 14.0   PLATELETS 10*3/mm3 157 149 167     Results from last 7 days   Lab Units 03/15/23  0417 03/14/23  0617 03/13/23  1855   SODIUM mmol/L 144 143 142   POTASSIUM mmol/L 3.7 3.7 4.1   CHLORIDE mmol/L 108* 108* 104   CO2 mmol/L 23.0 24.3 23.7   BUN mg/dL 28* 22 23   CREATININE mg/dL 1.46* 1.29* 1.46*   GLUCOSE mg/dL 104* 111* 123*   EGFR mL/min/1.73 48.6* 56.4* 48.6*     Results from last 7 days   Lab Units 03/13/23  1855   ALBUMIN g/dL 4.4   BILIRUBIN mg/dL 0.4   ALK PHOS U/L 80   AST (SGOT) U/L 22   ALT (SGPT) U/L 45*     Results from last 7 days   Lab Units 03/15/23  0417 03/14/23  0617 03/13/23  1855   CALCIUM mg/dL 9.0 9.5 9.5   ALBUMIN g/dL  --   --  4.4       No results found for: HGBA1C, POCGLU    CT Head Without Contrast    Result Date: 3/13/2023  1.  No noncontrast CT findings of acute intracranial abnormality. Right posterior parietal approach ventriculostomy catheter tip terminating over the left lateral ventricle, as before. The degree of dilation of the lateral and third ventricles is grossly unchanged since 07/13/2022.   This report was finalized on 3/13/2023 8:12 PM by Dr. Nick Chakraborty M.D.      I have personally reviewed all medications:  Scheduled Medications  amLODIPine, 5 mg, Oral, Daily  cyanocobalamin, 1,000 mcg, Intramuscular, Daily   Followed by  [START ON 3/18/2023] vitamin B-12, 1,000 mcg, Oral, Daily  donepezil, 10 mg, Oral, Nightly  finasteride, 5 mg, Oral, Daily  thiamine, 100 mg, Oral, Daily   And  multivitamin, 1 tablet, Oral, Daily   And  folic acid, 1 mg, Oral, Daily  memantine, 10 mg, Oral, Q12H  rosuvastatin, 10 mg, Oral, Daily  sodium chloride, 10 mL, Intravenous, Q12H  tadalafil, 20 mg, Oral, Q24H  tamsulosin, 0.4 mg, Oral, Nightly  traZODone, 50 mg, Oral, Nightly  Vortioxetine HBr, 20 mg, Oral, Daily    Infusions   Diet  Diet: Cardiac Diets; Healthy  Heart (2-3 Na+); Texture: Regular Texture (IDDSI 7); Fluid Consistency: Thin (IDDSI 0)    I have personally reviewed:  [x]  Laboratory   [x]  Microbiology   [x]  Radiology   [x]  EKG/Telemetry  [x]  Cardiology/Vascular   [x]  Pathology    [x]  Records       Assessment/Plan     Active Hospital Problems    Diagnosis  POA   • **Generalized weakness [R53.1]  Yes   • Alcohol use [Z78.9]  Yes   • Hx of CABG [Z95.1]  Not Applicable   • Unsteady gait when walking [R26.81]  Yes   • Urinary incontinence [R32]  Yes   • KLEBER on CPAP [G47.33, Z99.89]  Not Applicable   • Normal pressure hydrocephalus (HCC) [G91.2]  Yes   • Hyperlipidemia [E78.5]  Yes   • Essential hypertension [I10]  Yes   • Coronary artery disease involving native coronary artery of native heart without angina pectoris [I25.10]  Yes      Resolved Hospital Problems   No resolved problems to display.       77 y.o. male admitted with Generalized weakness.    Mr. Mae is a 77 y.o. male former smoker with a history of CAD/CABG, hydrocephalus s/p VPS placement, HTN, HLD, KLEBER, RBBB, impaired memory who is admitted for impaired gait/balance, urinary incontinence, weakness     Weakness/impaired gait/urinary incontinence/NPH s/p VPS:  -No changes on CTH. UA unremarkable. MICHAEL consulted;  shunt setting appropriate, no changes were made. OT/PT following, likely home health. Low normal B12, start supplementation     HTN:  - BP acceptable acutely. Continue amlodipine     CAD/CABG:  -Denies chest pain, CHF symptoms. EKG SR, no change. Echo 2021 EF 66%, LV diastolic normal function     KLEBER:  -Monitor on oximetry. Use home unit if available     Alcohol use:  -Per nursing, reported pt drinks 2 glasses bourbon/night. CIWA protocol ordered. No evidence of withdrawal       · SCDs for DVT prophylaxis.  · Full code.  · Discussed with patient and nursing staff.  · Anticipate discharge home with home health in 1-2 days.      FRANSISCO Cavanaugh  [facility]  03/15/23  14:44 EDT

## 2023-03-15 NOTE — THERAPY EVALUATION
Patient Name: Sharif Mae  : 1945    MRN: [id number]                              Today's Date: 3/15/2023       Admit Date: 3/13/2023    Visit Dx:     ICD-10-CM ICD-9-CM   1. Generalized weakness  R53.1 780.79   2. Gait disturbance  R26.9 781.2   3. Urinary incontinence, unspecified type  R32 788.30   4. Acute renal insufficiency  N28.9 593.9     Patient Active Problem List   Diagnosis   • Coronary artery disease involving native coronary artery of native heart without angina pectoris   • Essential hypertension   • Hyperlipidemia   • RBBB (right bundle branch block)   • Normal pressure hydrocephalus (HCC)   • KLEBER on CPAP   • BPH (benign prostatic hyperplasia)   • Generalized weakness   • Hx of CABG   • Unsteady gait when walking   • Urinary incontinence   • Alcohol use     Past Medical History:   Diagnosis Date   • CAD (coronary artery disease)     s/p CABG   • H/O echocardiogram    • Health care maintenance    • History of EKG 2015   • Hx of CABG    • Hydrocephalus (HCC)    • Hyperlipidemia    • Hypertension    • Lumbar canal stenosis    • Memory impairment     ON MED   • KLEBER on CPAP     NOT CURRENTLY USING AT THIS TIME   • RBBB (right bundle branch block)    • Spondylisthesis    • Squamous cell carcinoma of skin    • Stroke syndrome    • Unsteady gait when walking    • Unsteady gait when walking      Past Surgical History:   Procedure Laterality Date   • CARDIAC CATHETERIZATION  2004; Showed 90% stenosis in the OM-1, 50% stenosis in the distal left circumflex, 70% stenosis in the Ramus, and 40-50% stenosis in the ditsal RCA.   • CORONARY ARTERY BYPASS GRAFT  2004    LIMA to the LAD, KITTY to the second obtuse marginal, saphenous vein graft to the ramus, and saphenous vein graft to the first obtuse marginal    • KNEE SURGERY Right 2014   •  SHUNT INSERTION N/A 2022    Procedure: VENTRICULAR PERITONEAL SHUNT INSERTION;  Surgeon: Aquiles Pearce MD;  Location:   [facility];  Service: Neurosurgery;  Laterality: N/A;      General Information     Row Name 03/15/23 1325          OT Time and Intention    Document Type evaluation  -RB     Mode of Treatment co-treatment;physical therapy;occupational therapy  -RB     Row Name 03/15/23 1325          General Information    Patient Profile Reviewed yes  -RB     Prior Level of Function independent:;ADL's;transfer  -RB     Existing Precautions/Restrictions fall  -RB     Barriers to Rehab medically complex;previous functional deficit;cognitive status  -RB     Row Name 03/15/23 1325          Living Environment    People in Home spouse  -RB     Row Name 03/15/23 1325          Home Main Entrance    Number of Stairs, Main Entrance none  -RB     Row Name 03/15/23 1325          Stairs Within Home, Primary    Number of Stairs, Within Home, Primary none  -RB     Row Name 03/15/23 1325          Cognition    Orientation Status (Cognition) oriented x 3  -RB     Row Name 03/15/23 1325          Safety Issues, Functional Mobility    Safety Issues Affecting Function (Mobility) safety precautions follow-through/compliance;safety precaution awareness;awareness of need for assistance;judgment;insight into deficits/self-awareness  -RB     Impairments Affecting Function (Mobility) balance;endurance/activity tolerance;coordination;cognition;strength  -RB           User Key  (r) = Recorded By, (t) = Taken By, (c) = Cosigned By    Initials Name Provider Type    RB Jocelyn Almanzar OT Occupational Therapist                 Mobility/ADL's     Row Name 03/15/23 1331          Bed Mobility    Bed Mobility supine-sit  -RB     Supine-Sit [place] (Bed Mobility) verbal cues;contact guard  -RB     Assistive Device (Bed Mobility) bed rails  -RB     Row Name 03/15/23 1331          Transfers    Transfers sit-stand transfer;stand-sit transfer;bed-chair transfer  -RB     Row Name 03/15/23 1331          Bed-Chair Transfer    Bed-Chair [place] (Transfers) contact  guard  -RB     Assistive Device (Bed-Chair Transfers) walker, front-wheeled  -RB     Row Name 03/15/23 1331          Sit-Stand Transfer    Sit-Stand [place] (Transfers) contact guard  -RB     Assistive Device (Sit-Stand Transfers) walker, front-wheeled  -RB     Row Name 03/15/23 1331          Stand-Sit Transfer    Stand-Sit [place] (Transfers) contact guard  -RB     Assistive Device (Stand-Sit Transfers) walker, front-wheeled  -RB     Row Name 03/15/23 1331          Functional Mobility    Functional Mobility- Ind. Level verbal cues required;contact guard assist  -RB     Functional Mobility- Device walker, front-wheeled  -RB     Row Name 03/15/23 1331          Activities of Daily Living    BADL Assessment/Intervention grooming  -RB     Row Name 03/15/23 1331          Grooming Assessment/Training    [place] Level (Grooming) grooming skills;contact guard assist  -RB     Position (Grooming) sink side;supported standing  -RB           User Key  (r) = Recorded By, (t) = Taken By, (c) = Cosigned By    Initials Name Provider Type    Jocelyn Tian OT Occupational Therapist               Obj/Interventions     Row Name 03/15/23 1332          Sensory Assessment (Somatosensory)    Sensory Assessment (Somatosensory) sensation intact  -RB     Row Name 03/15/23 1332          Vision Assessment/Intervention    Visual Impairment/Limitations WFL  -RB     Row Name 03/15/23 1332          Range of Motion Comprehensive    Comment, General Range of Motion BUE WFL  -RB     Row Name 03/15/23 1332          Strength Comprehensive (MMT)    Comment, General Manual Muscle Testing (MMT) Assessment BLE generalized weakness - BUE WFL  -RB     Row Name 03/15/23 1332          Balance    Comment, Balance CGA sinkside grooming ADL's - use of walker for BUE support while standing.  -RB           User Key  (r) = Recorded By, (t) = Taken By, (c) = Cosigned By    Initials Name Provider Type    Jocelyn Tian OT Occupational  Therapist               Goals/Plan     Row Name 03/15/23 1333          Bed Mobility Goal 1 (OT)    Activity/Assistive Device (Bed Mobility Goal 1, OT) bed mobility activities, all  -RB     [place] Level/Cues Needed (Bed Mobility Goal 1, OT) supervision required  -RB     Time Frame (Bed Mobility Goal 1, OT) short term goal (STG);2 weeks  -RB     Progress/Outcomes (Bed Mobility Goal 1, OT) continuing progress toward goal  -RB     Row Name 03/15/23 1333          Transfer Goal 1 (OT)    Activity/Assistive Device (Transfer Goal 1, OT) transfers, all  -RB     [place] Level/Cues Needed (Transfer Goal 1, OT) supervision required  -RB     Time Frame (Transfer Goal 1, OT) short term goal (STG);2 weeks  -RB     Progress/Outcome (Transfer Goal 1, OT) continuing progress toward goal  -RB     Row Name 03/15/23 1333          Dressing Goal 1 (OT)    Activity/Device (Dressing Goal 1, OT) dressing skills, all  -RB     [place]/Cues Needed (Dressing Goal 1, OT) supervision required  -RB     Time Frame (Dressing Goal 1, OT) short term goal (STG);2 weeks  -RB     Progress/Outcome (Dressing Goal 1, OT) continuing progress toward goal  -RB     Row Name 03/15/23 1333          Toileting Goal 1 (OT)    Activity/Device (Toileting Goal 1, OT) toileting skills, all  -RB     [place] Level/Cues Needed (Toileting Goal 1, OT) supervision required  -RB     Time Frame (Toileting Goal 1, OT) short term goal (STG);2 weeks  -RB     Progress/Outcome (Toileting Goal 1, OT) continuing progress toward goal  -RB     Row Name 03/15/23 1333          Grooming Goal 1 (OT)    Activity/Device (Grooming Goal 1, OT) grooming skills, all  -RB     [place] (Grooming Goal 1, OT) supervision required  -RB     Time Frame (Grooming Goal 1, OT) short term goal (STG);2 weeks  -RB     Progress/Outcome (Grooming Goal 1, OT) continuing progress toward goal  -RB     Row Name 03/15/23 1333          Therapy Assessment/Plan (OT)    Planned Therapy  Interventions (OT) transfer/mobility retraining;strengthening exercise;ROM/therapeutic exercise;activity tolerance training;adaptive equipment training;functional balance retraining;BADL retraining;cognitive/visual perception retraining;occupation/activity based interventions;patient/caregiver education/training  -RB           User Key  (r) = Recorded By, (t) = Taken By, (c) = Cosigned By    Initials Name Provider Type    RB Jocelyn Almanzar OT Occupational Therapist               Clinical Impression     Row Name 03/15/23 1333          Pain Assessment    Pretreatment Pain Rating 0/10 - no pain  -RB     Posttreatment Pain Rating 0/10 - no pain  -RB     Row Name 03/15/23 1333          Plan of Care Review    Plan of Care Reviewed With patient  -RB     Progress no change  -RB     Row Name 03/15/23 1333          Therapy Assessment/Plan (OT)    Rehab Potential (OT) good, to achieve stated therapy goals  -RB     Criteria for Skilled Therapeutic Interventions Met (OT) yes;skilled treatment is necessary  -RB     Therapy Frequency (OT) 3 times/wk  -RB     Row Name 03/15/23 1333          Therapy Plan Review/Discharge Plan (OT)    Anticipated Discharge Disposition (OT) home with /7 care;home with home health  -RB     Row Name 03/15/23 1333          Vital Signs    O2 Delivery Pre Treatment room air  -RB     O2 Delivery Intra Treatment room air  -RB     O2 Delivery Post Treatment room air  -RB     Pre Patient Position Supine  -RB     Intra Patient Position Standing  -RB     Post Patient Position Sitting  -RB     Row Name 03/15/23 1333          Positioning and Restraints    Pre-Treatment Position in bed  -RB     Post Treatment Position chair  -RB     In Chair notified nsg;reclined;sitting;call light within reach;encouraged to call for assist;exit alarm on;legs elevated  -RB           User Key  (r) = Recorded By, (t) = Taken By, (c) = Cosigned By    Initials Name Provider Type    Jocelyn Tian OT Occupational Therapist                Outcome Measures     Row Name 03/15/23 1334          How much help from another is currently needed...    Putting on and taking off regular lower body clothing? 2  -RB     Bathing (including washing, rinsing, and drying) 2  -RB     Toileting (which includes using toilet bed pan or urinal) 2  -RB     Putting on and taking off regular upper body clothing 3  -RB     Taking care of personal grooming (such as brushing teeth) 3  -RB     Eating meals 3  -RB     AM-PAC 6 Clicks Score (OT) 15  -RB     Row Name 03/15/23 1046          How much help from another person do you currently need...    Turning from your back to your side while in flat bed without using bedrails? 4  -DJ     Moving from lying on back to sitting on the side of a flat bed without bedrails? 3  -DJ     Moving to and from a bed to a chair (including a wheelchair)? 3  -DJ     Standing up from a chair using your arms (e.g., wheelchair, bedside chair)? 3  -DJ     Climbing 3-5 steps with a railing? 2  -DJ     To walk in hospital room? 3  -DJ     AM-PAC 6 Clicks Score (PT) 18  -DJ     Highest level of mobility 6 --> Walked 10 steps or more  -DJ     Row Name 03/15/23 1334          Modified [place] Scale    Modified Bryan Scale 4 - Moderately severe disability.  Unable to walk without assistance, and unable to attend to own bodily needs without assistance.  -RB     Row Name 03/15/23 1334 03/15/23 1046       Functional Assessment    Outcome Measure Options AM-PAC 6 Clicks Daily Activity (OT);Modified Bryan  -RB AM-PAC 6 Clicks Basic Mobility (PT)  -DJ          User Key  (r) = Recorded By, (t) = Taken By, (c) = Cosigned By    Initials Name Provider Type    Janet Bear, PT Physical Therapist    RB Jocelyn Almanzar OT Occupational Therapist                Occupational Therapy Education     Title: PT OT SLP Therapies (In Progress)     Topic: Occupational Therapy (Not Started)     Point: ADL training (Not Started)     Description:   Instruct  learner(s) on proper safety adaptation and remediation techniques during self care or transfers.   Instruct in proper use of assistive devices.              Learner Progress:  Not documented in this visit.          Point: Home exercise program (Not Started)     Description:   Instruct learner(s) on appropriate technique for monitoring, assisting and/or progressing therapeutic exercises/activities.              Learner Progress:  Not documented in this visit.          Point: Precautions (Not Started)     Description:   Instruct learner(s) on prescribed precautions during self-care and functional transfers.              Learner Progress:  Not documented in this visit.          Point: Body mechanics (Not Started)     Description:   Instruct learner(s) on proper positioning and spine alignment during self-care, functional mobility activities and/or exercises.              Learner Progress:  Not documented in this visit.                          OT Recommendation and Plan  Planned Therapy Interventions (OT): transfer/mobility retraining, strengthening exercise, ROM/therapeutic exercise, activity tolerance training, adaptive equipment training, functional balance retraining, BADL retraining, cognitive/visual perception retraining, occupation/activity based interventions, patient/caregiver education/training  Therapy Frequency (OT): 3 times/wk  Plan of Care Review  Plan of Care Reviewed With: patient  Progress: no change     Time Calculation:    Time Calculation- OT     Row Name 03/15/23 1324             Time Calculation- OT    OT Start Time 0934  -RB      OT Stop Time 0959  -RB      OT Time Calculation (min) 25 min  -RB      Total Timed Code Minutes- OT 15 minute(s)  -RB      OT Received On 03/15/23  -RB      OT - Next Appointment 03/17/23  -RB      OT Goal Re-Cert Due Date 03/29/23  -RB         Timed Charges    72643 - OT Self Care/Mgmt Minutes 15  -RB         Untimed Charges    OT Eval/Re-eval Minutes 10  -RB          Total Minutes    Timed Charges Total Minutes 15  -RB      Untimed Charges Total Minutes 10  -RB       Total Minutes 25  -RB            User Key  (r) = Recorded By, (t) = Taken By, (c) = Cosigned By    Initials Name Provider Type    RB Jocelyn Almanzar OT Occupational Therapist              Therapy Charges for Today     Code Description Service Date Service Provider Modifiers Qty    [id number]  OT SELF CARE/MGMT/TRAIN EA 15 MIN 3/15/2023 Jocelyn Almanzar OT GO 1    [id number]  OT EVAL MOD COMPLEXITY 2 3/15/2023 Jocelyn Almanzar OT GO 1               Jocelyn Almanzar OT  3/15/2023

## 2023-03-15 NOTE — PROGRESS NOTES
[facility] NEUROSURGERY INTRACRANIAL PROGRESS NOTE    PATIENT IDENTIFICATION:   Name:  Sharif Mae      MRN:  [id number]     77 y.o.  male               CC:Worsening gait disturbance and urinary incontinence            Subjective     Interval History: Patient continues to have complaint of urinary incontinence, unsteadiness on feet with decreased endurance per PT, but shows minimal improvement in cognition today.      ROS:  Constitutional: No fever, chills  HEENT: No headache, no vision changes, , no hearing difficulties,  Neck: no stiffness  GI: No nausea, vomiting, no swallow difficulties  Neuro: No numbness, no speech difficulties, feeling weak in bilateral feet unchanged from yesterday , memory issues    Objective     Vital signs in last 24 hours:  Temp:  [97.4 °F (36.3 °C)-98.1 °F (36.7 °C)] 97.4 °F (36.3 °C)  Heart Rate:  [52-65] 54  Resp:  [16] 16  BP: (117-131)/(61-80) 129/67      Intake/Output this shift:  I/O this shift:  In: 120 [P.O.:120]  Out: -       Intake/Output last 3 shifts:  No intake/output data recorded.    LABS:  .  Results from last 7 days   Lab Units 03/15/23  0417   WBC 10*3/mm3 7.72   HEMOGLOBIN g/dL 13.5   HEMATOCRIT % 39.3   PLATELETS 10*3/mm3 157     .  Results from last 7 days   Lab Units 03/15/23  0417 03/14/23  0617 03/13/23  1855   SODIUM mmol/L 144   < > 142   POTASSIUM mmol/L 3.7   < > 4.1   CHLORIDE mmol/L 108*   < > 104   CO2 mmol/L 23.0   < > 23.7   BUN mg/dL 28*   < > 23   CREATININE mg/dL 1.46*   < > 1.46*   CALCIUM mg/dL 9.0   < > 9.5   BILIRUBIN mg/dL  --   --  0.4   ALK PHOS U/L  --   --  80   ALT (SGPT) U/L  --   --  45*   AST (SGOT) U/L  --   --  22   GLUCOSE mg/dL 104*   < > 123*    < > = values in this interval not displayed.       IMAGING STUDIES:  No new imaging    I personally viewed and interpreted the patient's lab and chart.    Meds reviewed/changed: Yes      Physical Exam:    General:   Awake, alert, oriented x3.   Neck:    supple  CN II:    Visual fields  full to confrontation  CN III IV VI:   Extraocular movements are full , PERRL 3 mm brisk   CNV:  normal  CN VII:    Facial movements are symmetric. No weakness.  Motor:    No focal drift.  No fasciculations, rigidity, spasticity, or abnormal movements.  Reflexes: Upper extremities 1+, negative Pablo negative clonus, patellar tendons 1+, Babinski negative     Sensation:   Normal to light touch; no extinction  Station and Gait:  Per PT note 3/15/2023:he was resting in bed in NAD, pleasant and cooperative. He req CGA for bed mobility and to stand from EOB. Pt amb 110' with r wx and min A of 1-2 on level surfaces. Pace is slow, steps are shuffled, decreased endurance limites further amb distance, balance is mildly unsteady. He stood at sink and brushed teeth with OT with fair balance. He would benefit from skilled PT services to address functional deficits and prepare for d/c.  Coordination:   Finger-to-nose test shows no dysmetria.    Extremities:   Wearing SCD  Neuro: no aphasia, no dysarthria, recalls 3 out of 3 words.  3-minute recall 2 out of words.  Judgment and thought process appropriate .  Flat affect with poor eye contact.    Assessment & Plan     ASSESSMENT:      Generalized weakness    Coronary artery disease involving native coronary artery of native heart without angina pectoris    Essential hypertension    Hyperlipidemia    Normal pressure hydrocephalus (HCC)    KLEBER on CPAP    Hx of CABG    Unsteady gait when walking    Urinary incontinence    Alcohol use    The exam today is essentially unchanged and the patient has worked with physical therapy today which shows he has some decreased endurance and mild instability with ambulation.  Neurosurgery will not make any adjustments to his shunt at this time.    PLAN:       -Neurosurgery to sign off  -No need for follow-up at this time, however, if the patient develops new problems or has questions we can see him in our office.          I discussed the  "patient's findings and my recommendations with patient and Dr. Pearce    During patient visit, I utilized appropriate personal protective equipment including mask and gloves.  Mask used was standard procedure mask. Appropriate PPE was worn during the entire visit.  Hand hygiene was completed before and after.      LOS: 0 days       Jai Surya Noriega, APRN  3/15/2023  09:57 EDT    \"Dictated utilizing Dragon dictation\".    "

## 2023-03-15 NOTE — PLAN OF CARE
The pt was admitted to [facility] 2/2 to generalized weakness. Pmhx includes a  shunt, heart block, CABG. He lives in a single level home with his spouse and reports independence with ADL's and functional mobility. He does not use a walker. Today, the pt was oriented x3. He completed bed mobility with supervision. CGA + rwx for OOB functional mobility and standing sinkside grooming ADL's. Sequencing and general memory deficits present during the evaluation. He will require a Rwx for d/c - he plans to return home with HH and his wife's assistance.     Patient was not wearing a face mask during this therapy encounter. Therapist used appropriate personal protective equipment including mask and gloves. Mask used was standard procedure mask. Appropriate PPE was worn during the entire therapy session. Hand hygiene was completed before and after therapy session. Patient is not in enhanced droplet precautions.

## 2023-03-15 NOTE — TELEPHONE ENCOUNTER
PATIENT WENT TO THE ER BECAUSE HE WAS HAVING TROUBLE WALKING. ER DID IMAGING AND ADMITTED HIM.  PATIENT'S WIFE WOULD LIKE FOR SOMEONE ON DR BARRON'S TEAM TO COME CHECK HIS SHUNT BECAUSE SHE FEELS SOMETHING IS WRONG WITH THE SHUNT AS TO WHY HE IS HAVING TROUBLE WALKING.  HE IS IN ROOM 549E.  PLEASE ADVISE.    [phone]

## 2023-03-15 NOTE — CASE MANAGEMENT/SOCIAL WORK
Discharge Planning Assessment  [facility]     Patient Name: Sharif Mae  MRN: [id number]  Today's Date: 3/15/2023    Admit Date: 3/13/2023    Plan: home; referral to [facility]   Discharge Needs Assessment     Row Name 03/15/23 1052       Living Environment    People in Home spouse    Current Living Arrangements home    Primary Care Provided by self    Provides Primary Care For no one    Family Caregiver if Needed spouse    Quality of Family Relationships helpful;involved    Able to Return to Prior Arrangements yes       Resource/Environmental Concerns    Resource/Environmental Concerns none       Transition Planning    Patient/Family Anticipates Transition to home with family    Patient/Family Anticipated Services at Transition home health care    Transportation Anticipated family or friend will provide       Discharge Needs Assessment    Readmission Within the Last 30 Days no previous admission in last 30 days    Current Outpatient/Agency/Support Group homecare agency    Equipment Currently Used at Home none    Concerns to be Addressed discharge planning    Equipment Needed After Discharge none    Outpatient/Agency/Support Group Needs homecare agency    Discharge Facility/Level of Care Needs home with home health    Provided Post Acute Provider List? Yes    Post Acute Provider List Home Health    Delivered To Support Person    Method of Delivery Telephone               Discharge Plan     Row Name 03/15/23 1054       Plan    Plan home; referral to [facility]    Patient/Family in Agreement with Plan yes    Plan Comments Spoke with pt's wife via phone as pt is confused. Confirmed facesheet correct. Explained role of CCP. Wife reports pt lives at home and is IADLs. He has a rollator at home he can use if needed. Pts PCP is FRANSISCO Ramirez and uses [facility] with no issues. Pt has no HH or SNF history. Discussed d/c planning with wife. Her plan is for pt to return home but feels pt will need HH, agreeable to [facility].  Wife will transport at d/c. [facility] to follow.              Continued Care and Services - Admitted Since 3/13/2023     Home Medical Care     Service Provider Request Status Selected Services Address Phone Fax Patient Preferred     [facility] Pending - Request Sent N/A [address] [address] [phone] --                 Demographic Summary    No documentation.                Functional Status    No documentation.                Psychosocial    No documentation.                Abuse/Neglect    No documentation.                Legal    No documentation.                Substance Abuse    No documentation.                Patient Forms    No documentation.                   ALTAGRACIA Alonso

## 2023-03-15 NOTE — PLAN OF CARE
Problem: Adult Inpatient Plan of Care  Goal: Plan of Care Review  Outcome: Ongoing, Progressing  Flowsheets  Taken 3/15/2023 1430 by Chhaya Arthur RN  Plan of Care Reviewed With: patient  Taken 3/15/2023 1333 by Jocelyn Almanzar OT  Progress: no change   Goal Outcome Evaluation:  Plan of Care Reviewed With: patient        Progress: no change       Pt was able to work with therapy. He sat up in the chair most of the day. Pt still shuffles when he walks and is still confused. Pt needs reorientation throughout the day. VSS. Anticipate discharge home in the next day or two.

## 2023-03-15 NOTE — THERAPY EVALUATION
Patient Name: Sharif Mae  : 1945    MRN: [id number]                              Today's Date: 3/15/2023       Admit Date: 3/13/2023    Visit Dx:     ICD-10-CM ICD-9-CM   1. Generalized weakness  R53.1 780.79   2. Gait disturbance  R26.9 781.2   3. Urinary incontinence, unspecified type  R32 788.30   4. Acute renal insufficiency  N28.9 593.9     Patient Active Problem List   Diagnosis   • Coronary artery disease involving native coronary artery of native heart without angina pectoris   • Essential hypertension   • Hyperlipidemia   • RBBB (right bundle branch block)   • Normal pressure hydrocephalus (HCC)   • KLEBER on CPAP   • BPH (benign prostatic hyperplasia)   • Generalized weakness   • Hx of CABG   • Unsteady gait when walking   • Urinary incontinence   • Alcohol use     Past Medical History:   Diagnosis Date   • CAD (coronary artery disease)     s/p CABG   • H/O echocardiogram    • Health care maintenance    • History of EKG 2015   • Hx of CABG    • Hydrocephalus (HCC)    • Hyperlipidemia    • Hypertension    • Lumbar canal stenosis    • Memory impairment     ON MED   • KLEBER on CPAP     NOT CURRENTLY USING AT THIS TIME   • RBBB (right bundle branch block)    • Spondylisthesis    • Squamous cell carcinoma of skin    • Stroke syndrome    • Unsteady gait when walking    • Unsteady gait when walking      Past Surgical History:   Procedure Laterality Date   • CARDIAC CATHETERIZATION  2004; Showed 90% stenosis in the OM-1, 50% stenosis in the distal left circumflex, 70% stenosis in the Ramus, and 40-50% stenosis in the ditsal RCA.   • CORONARY ARTERY BYPASS GRAFT  2004    LIMA to the LAD, KITTY to the second obtuse marginal, saphenous vein graft to the ramus, and saphenous vein graft to the first obtuse marginal    • KNEE SURGERY Right 2014   •  SHUNT INSERTION N/A 2022    Procedure: VENTRICULAR PERITONEAL SHUNT INSERTION;  Surgeon: Aquiles Pearce MD;  Location:   [facility];  Service: Neurosurgery;  Laterality: N/A;      General Information     Row Name 03/15/23 1037 03/15/23 1034       Physical Therapy Time and Intention    Document Type -- evaluation  -DJ    Mode of Treatment co-treatment;physical therapy;occupational therapy  -DJ individual therapy;physical therapy;co-treatment;occupational therapy  -DJ    Row Name 03/15/23 1034          General Information    Patient Profile Reviewed yes  -DJ     Prior Level of Function independent:;ADL's  -DJ     Existing Precautions/Restrictions fall  -DJ     Barriers to Rehab medically complex;previous functional deficit  -DJ     Row Name 03/15/23 1034          Living Environment    People in Home spouse  -DJ     Row Name 03/15/23 1034          Home Main Entrance    Number of Stairs, Main Entrance none  -DJ     Row Name 03/15/23 1034          Stairs Within Home, Primary    Number of Stairs, Within Home, Primary none  -DJ     Row Name 03/15/23 1034          Cognition    Orientation Status (Cognition) oriented x 3  -DJ     Row Name 03/15/23 1034          Safety Issues, Functional Mobility    Safety Issues Affecting Function (Mobility) judgment;safety precaution awareness  -DJ     Impairments Affecting Function (Mobility) balance;endurance/activity tolerance;coordination  -DJ     Comment, Safety Issues/Impairments (Mobility) gt belt, nonskid socks  -DJ           User Key  (r) = Recorded By, (t) = Taken By, (c) = Cosigned By    Initials Name Provider Type    DJ Janet Elmore, PT Physical Therapist               Mobility     Row Name 03/15/23 1036          Bed Mobility    Bed Mobility supine-sit  -DJ     Supine-Sit [place] (Bed Mobility) contact guard;verbal cues  -DJ     Assistive Device (Bed Mobility) bed rails;head of bed elevated  -DJ     Comment, (Bed Mobility) vc for sequencing  -DJ     Row Name 03/15/23 1036          Transfers    Comment, (Transfers) sit/stand from EOB  -DJ     Row Name 03/15/23 1036          Bed-Chair Transfer     Bed-Chair [place] (Transfers) --  -DJ     Row Name 03/15/23 1036          Sit-Stand Transfer    Sit-Stand [place] (Transfers) contact guard;verbal cues  -DJ     Row Name 03/15/23 1036          Gait/Stairs (Locomotion)    [place] Level (Gait) minimum assist (75% patient effort);1 person assist;2 person assist;verbal cues  -DJ     Assistive Device (Gait) walker, front-wheeled  -DJ     Distance in Feet (Gait) 110'  -DJ     Deviations/Abnormal Patterns (Gait) emmy decreased;festinating/shuffling;gait speed decreased;stride length decreased  -DJ     Bilateral Gait Deviations forward flexed posture  -DJ     [place] Level (Stairs) not tested  -DJ     Comment, (Gait/Stairs) Pt amb 110' with r wx and min A of 1-2 on level surfaces. Pace is slow, steps are shuffled, decreased endurance limites further amb distance, balance is mildly unsteady.  -DJ           User Key  (r) = Recorded By, (t) = Taken By, (c) = Cosigned By    Initials Name Provider Type    DJ Janet Elmore, PT Physical Therapist               Obj/Interventions     Row Name 03/15/23 1039          Range of Motion Comprehensive    Comment, General Range of Motion WFL  -DJ     Row Name 03/15/23 1039          Strength Comprehensive (MMT)    Comment, General Manual Muscle Testing (MMT) Assessment good extremity strength  -DJ     Row Name 03/15/23 1039          Motor Skills    Motor Skills functional endurance  -DJ     Functional Endurance decreaesd  -DJ     Row Name 03/15/23 1039          Balance    Balance Assessment standing static balance;standing dynamic balance  -DJ     Static Standing Balance contact guard  -DJ     Dynamic Standing Balance contact guard;minimal assist  -DJ     Position/Device Used, Standing Balance walker, front-wheeled;supported  -DJ     Balance Interventions sitting;standing;sit to stand;supported;weight shifting activity  -DJ     Comment, Balance pt stood at sink and brushed teeth with SBA - CGA  -DJ     Row Name  03/15/23 1039          Sensory Assessment (Somatosensory)    Sensory Assessment (Somatosensory) not tested  -DJ           User Key  (r) = Recorded By, (t) = Taken By, (c) = Cosigned By    Initials Name Provider Type    Janet Bear, PT Physical Therapist               Goals/Plan     Row Name 03/15/23 1045          Bed Mobility Goal 1 (PT)    Activity/Assistive Device (Bed Mobility Goal 1, PT) sit to supine;supine to sit  -DJ     [place] Level/Cues Needed (Bed Mobility Goal 1, PT) standby assist;verbal cues required  -DJ     Time Frame (Bed Mobility Goal 1, PT) 10 days  -DJ     Row Name 03/15/23 1045          Transfer Goal 1 (PT)    Activity/Assistive Device (Transfer Goal 1, PT) sit-to-stand/stand-to-sit  -DJ     [place] Level/Cues Needed (Transfer Goal 1, PT) verbal cues required;standby assist  -DJ     Time Frame (Transfer Goal 1, PT) 10 days  -DJ     Row Name 03/15/23 1045          Gait Training Goal 1 (PT)    Activity/Assistive Device (Gait Training Goal 1, PT) gait (walking locomotion);assistive device use;improve balance and speed;increase endurance/gait distance;increase energy conservation;walker, rolling  -DJ     [place] Level (Gait Training Goal 1, PT) verbal cues required;standby assist  -DJ     Time Frame (Gait Training Goal 1, PT) 10 days  -DJ     Row Name 03/15/23 1045          Patient Education Goal (PT)    Activity (Patient Education Goal, PT) HEP  -DJ     [place]/Cues/Accuracy (Memory Goal 2, PT) demonstrates adequately;verbalizes understanding  -DJ     Time Frame (Patient Education Goal, PT) 5 days;short term goal (STG)  -DJ     Row Name 03/15/23 1045          Therapy Assessment/Plan (PT)    Planned Therapy Interventions (PT) balance training;bed mobility training;gait training;home exercise program;strengthening;postural re-education;patient/family education;transfer training  -DJ           User Key  (r) = Recorded By, (t) = Taken By, (c) = Cosigned By    Initials Name  Provider Type    Janet Bear, PT Physical Therapist               Clinical Impression     Row Name 03/15/23 1040          Pain    Pretreatment Pain Rating 0/10 - no pain  -DJ     Row Name 03/15/23 1040          Plan of Care Review    Plan of Care Reviewed With patient  -DJ     Outcome Evaluation 78yo obese white male admitted 3/13/23 with generalized weakness.H Includes CAD, hbp, water on brain,  shunt, unsteady gt, heart block, CABG. PLOF: Pt lives at home with spouse and was independent with ADLs and mobility. He is limited now by generalized weakness, unsteady balance, and decreased activity tolerance that limits his functional activities. Today, he was resting in bed in NAD, pleasant and cooperative. He req CGA for bed mobility and to stand from EOB. Pt amb 110' with r wx and min A of 1-2 on level surfaces. Pace is slow, steps are shuffled, decreased endurance limites further amb distance, balance is mildly unsteady. He stood at sink and brushed teeth with OT with fair balance. He would benefit from skilled PT services to address functional deficits and prepare for d/c. Pt hopeful to return home.  -DJ     Row Name 03/15/23 1040          Therapy Assessment/Plan (PT)    Patient/Family Therapy Goals Statement (PT) home  -DJ     Rehab Potential (PT) good, to achieve stated therapy goals  -DJ     Criteria for Skilled Interventions Met (PT) yes;meets criteria;skilled treatment is necessary  -DJ     Therapy Frequency (PT) 5 times/wk  -DJ     Row Name 03/15/23 1040          Vital Signs    Pre Patient Position Supine  -DJ     Intra Patient Position Standing  -DJ     Post Patient Position Sitting  -DJ     Row Name 03/15/23 1040          Positioning and Restraints    Pre-Treatment Position in bed  -DJ     Post Treatment Position chair  -DJ     In Chair notified nsg;reclined;call light within reach;encouraged to call for assist;exit alarm on  -DJ           User Key  (r) = Recorded By, (t) = Taken By, (c) = Cosigned By     Initials Name Provider Type    Janet Bear, SONIA Physical Therapist               Outcome Measures     Row Name 03/15/23 1046          How much help from another person do you currently need...    Turning from your back to your side while in flat bed without using bedrails? 4  -DJ     Moving from lying on back to sitting on the side of a flat bed without bedrails? 3  -DJ     Moving to and from a bed to a chair (including a wheelchair)? 3  -DJ     Standing up from a chair using your arms (e.g., wheelchair, bedside chair)? 3  -DJ     Climbing 3-5 steps with a railing? 2  -DJ     To walk in hospital room? 3  -DJ     AM-PAC 6 Clicks Score (PT) 18  -DJ     Highest level of mobility 6 --> Walked 10 steps or more  -DJ     Row Name 03/15/23 1046          Functional Assessment    Outcome Measure Options AM-PAC 6 Clicks Basic Mobility (PT)  -           User Key  (r) = Recorded By, (t) = Taken By, (c) = Cosigned By    Initials Name Provider Type    Janet Bear PT Physical Therapist                             Physical Therapy Education     Title: PT OT SLP Therapies (In Progress)     Topic: Physical Therapy (In Progress)     Point: Mobility training (Done)     Learning Progress Summary           Patient Acceptance, E, VU,NR by SAVANAH at 3/15/2023 1047                   Point: Home exercise program (Not Started)     Learner Progress:  Not documented in this visit.          Point: Body mechanics (Done)     Learning Progress Summary           Patient Acceptance, E, VU,NR by SAVANAH at 3/15/2023 1047                   Point: Precautions (Done)     Learning Progress Summary           Patient Acceptance, E, VU,NR by  at 3/15/2023 1047                               User Key     Initials Effective Dates Name Provider Type Discipline    SAVANAH 10/25/19 -  Janet Elmore, PT Physical Therapist PT              PT Recommendation and Plan  Planned Therapy Interventions (PT): balance training, bed mobility training, gait training, home  exercise program, strengthening, postural re-education, patient/family education, transfer training  Plan of Care Reviewed With: patient  Outcome Evaluation: 76yo obese white male admitted 3/13/23 with generalized weakness.H Includes CAD, hbp, water on brain,  shunt, unsteady gt, heart block, CABG. PLOF: Pt lives at home with spouse and was independent with ADLs and mobility. He is limited now by generalized weakness, unsteady balance, and decreased activity tolerance that limits his functional activities. Today, he was resting in bed in NAD, pleasant and cooperative. He req CGA for bed mobility and to stand from EOB. Pt amb 110' with r wx and min A of 1-2 on level surfaces. Pace is slow, steps are shuffled, decreased endurance limites further amb distance, balance is mildly unsteady. He stood at sink and brushed teeth with OT with fair balance. He would benefit from skilled PT services to address functional deficits and prepare for d/c. Pt hopeful to return home.     Time Calculation:    PT Charges     Row Name 03/15/23 1048             Time Calculation    Start Time 0936  -DJ      Stop Time 1001  -DJ      Time Calculation (min) 25 min  -DJ      PT Non-Billable Time (min) 10 min  -DJ      PT Received On 03/15/23  -DJ      PT - Next Appointment 03/16/23  -SAVANAH      PT Goal Re-Cert Due Date 03/25/23  -SAVANAH            User Key  (r) = Recorded By, (t) = Taken By, (c) = Cosigned By    Initials Name Provider Type    Janet Bear, SONIA Physical Therapist              Therapy Charges for Today     Code Description Service Date Service Provider Modifiers Qty    77276208871 HC PT EVAL MOD COMPLEXITY 2 3/15/2023 Janet Elmore, PT GP 1    28787778876 HC PT THERAPEUTIC ACT EA 15 MIN 3/15/2023 Janet Elmore, PT GP 1    62757652339 HC GAIT TRAINING EA 15 MIN 3/15/2023 Janet Elmore, PT GP 1    02438725894 HC PT THER SUPP EA 15 MIN 3/15/2023 Janet Elmore, PT GP 1          PT G-Codes  Outcome Measure Options: AM-PAC 6 Clicks Basic  Mobility (PT)  AM-PAC 6 Clicks Score (PT): 18  PT Discharge Summary  Anticipated Discharge Disposition (PT): home with home health, home with assist    Janet Elmore, PT  3/15/2023

## 2023-03-15 NOTE — PLAN OF CARE
Goal Outcome Evaluation:   VSS. Disoriented to time and situation. X1 episode of urinary incontinence overnight, up with assist x1 to bathroom other times. Pt gait is shuffled. PT/OT to see today. Call light in reach, bed alarm on.

## 2023-03-15 NOTE — DISCHARGE PLACEMENT REQUEST
"Sharif Pepe (77 y.o. Male)     Date of Birth   1945    Social Security Number       Address   [address] [place] [address]    Home Phone   [phone]    MRN   [id number]       Buddhist   Caodaism    Marital Status                               Admission Date   3/13/23    Admission Type   Emergency    Admitting Provider   Kameron Benito MD    Attending Provider   Kameron Benito MD    Department, Room/Bed   [address], [address]/1       Discharge Date       Discharge Disposition       Discharge Destination                               Attending Provider: Kameron Benito MD    Allergies: No Known Allergies    Isolation: None   Infection: None   Code Status: CPR    Ht: 170.2 cm (67.01\")   Wt: 106 kg (233 lb 7.5 oz)    Admission Cmt: None   Principal Problem: Generalized weakness [R53.1]                 Active Insurance as of 3/13/2023     Primary Coverage     Payor Plan Insurance Group Employer/Plan Group    [facility] MEDICARE REPLACEMENT [facility] MEDICARE REPLACEMENT [id number]     Payor Plan Address Payor Plan Phone Number Payor Plan Fax Number Effective Dates    [address] [address]   1/1/2016 - None Entered    [facility] [id number]       Subscriber Name Subscriber Birth Date Member ID       SHARIF PEPE 1945 [id number]                 Emergency Contacts      (Rel.) Home Phone Work Phone Mobile Phone    Jennifer Pepe (Spouse) [phone] -- --          "

## 2023-03-15 NOTE — PLAN OF CARE
Goal Outcome Evaluation:  Plan of Care Reviewed With: patient           Outcome Evaluation: 78yo obese white male admitted 3/13/23 with generalized weakness.H Includes CAD, hbp, water on brain,  shunt, unsteady gt, heart block, CABG. PLOF: Pt lives at home with spouse and was independent with ADLs and mobility. He is limited now by generalized weakness, unsteady balance, and decreased activity tolerance that limits his functional activities. Today, he was resting in bed in NAD, pleasant and cooperative. He req CGA for bed mobility and to stand from EOB. Pt amb 110' with r wx and min A of 1-2 on level surfaces. Pace is slow, steps are shuffled, decreased endurance limites further amb distance, balance is mildly unsteady. He stood at sink and brushed teeth with OT with fair balance. He would benefit from skilled PT services to address functional deficits and prepare for d/c. Pt hopeful to return home.

## 2023-03-16 VITALS
SYSTOLIC BLOOD PRESSURE: 117 MMHG | RESPIRATION RATE: 16 BRPM | TEMPERATURE: 97.5 F | HEART RATE: 60 BPM | OXYGEN SATURATION: 93 % | BODY MASS INDEX: 36.64 KG/M2 | HEIGHT: 67 IN | WEIGHT: 233.47 LBS | DIASTOLIC BLOOD PRESSURE: 66 MMHG

## 2023-03-16 PROCEDURE — 25010000002 CYANOCOBALAMIN PER 1000 MCG: Performed by: INTERNAL MEDICINE

## 2023-03-16 PROCEDURE — G0378 HOSPITAL OBSERVATION PER HR: HCPCS

## 2023-03-16 PROCEDURE — 96372 THER/PROPH/DIAG INJ SC/IM: CPT

## 2023-03-16 RX ADMIN — Medication 100 MG: at 08:43

## 2023-03-16 RX ADMIN — TADALAFIL 20 MG: 5 TABLET, FILM COATED ORAL at 08:42

## 2023-03-16 RX ADMIN — FOLIC ACID 1 MG: 1 TABLET ORAL at 08:42

## 2023-03-16 RX ADMIN — MEMANTINE HYDROCHLORIDE 10 MG: 10 TABLET, FILM COATED ORAL at 08:43

## 2023-03-16 RX ADMIN — ROSUVASTATIN CALCIUM 10 MG: 10 TABLET, FILM COATED ORAL at 08:43

## 2023-03-16 RX ADMIN — AMLODIPINE BESYLATE 5 MG: 5 TABLET ORAL at 08:43

## 2023-03-16 RX ADMIN — VORTIOXETINE 20 MG: 5 TABLET, FILM COATED ORAL at 08:43

## 2023-03-16 RX ADMIN — Medication 1 TABLET: at 08:43

## 2023-03-16 RX ADMIN — CYANOCOBALAMIN 1000 MCG: 1000 INJECTION, SOLUTION INTRAMUSCULAR; SUBCUTANEOUS at 08:43

## 2023-03-16 RX ADMIN — FINASTERIDE 5 MG: 5 TABLET, FILM COATED ORAL at 08:42

## 2023-03-16 NOTE — PLAN OF CARE
Goal Outcome Evaluation:   Alert to self and place, VSS, R/A, no complaints of discomfort throughout shift, will continue to monitor

## 2023-03-16 NOTE — CASE MANAGEMENT/SOCIAL WORK
Case Management Discharge Note      Final Note: Discharged home with [facility]     Provided Post Acute Provider List?: Yes  Post Acute Provider List: Home Health  Delivered To: Support Person  Method of Delivery: Telephone    Selected Continued Care - Admitted Since 3/13/2023     Destination    No services have been selected for the patient.              Durable Medical Equipment    No services have been selected for the patient.              Dialysis/Infusion    No services have been selected for the patient.              Home Medical Care     Service Provider Selected Services Address Phone Fax Patient Preferred    [facility] [address], [address], [facility] [address] [phone] --          Therapy    No services have been selected for the patient.              Community Resources    No services have been selected for the patient.              Community & DME    No services have been selected for the patient.                  Transportation Services  Private: Car    Final Discharge Disposition Code: 06 - home with home health care

## 2023-03-16 NOTE — DISCHARGE SUMMARY
Patient Name: Sharif Mae  : 1945  MRN: [id number]    Date of Admission: 3/13/2023  Date of Discharge:  3/16/2023  Primary Care Physician: Ryan Stephen APRN      Chief Complaint:   Weakness - Generalized, Fatigue, Fecal Incontinence, and Urinary Incontinence      Discharge Diagnoses     Active Hospital Problems    Diagnosis  POA   • **Generalized weakness [R53.1]  Yes   • Alcohol use [Z78.9]  Yes   • Hx of CABG [Z95.1]  Not Applicable   • Unsteady gait when walking [R26.81]  Yes   • Urinary incontinence [R32]  Yes   • KLEBER on CPAP [G47.33, Z99.89]  Not Applicable   • Normal pressure hydrocephalus (HCC) [G91.2]  Yes   • Hyperlipidemia [E78.5]  Yes   • Essential hypertension [I10]  Yes   • Coronary artery disease involving native coronary artery of native heart without angina pectoris [I25.10]  Yes      Resolved Hospital Problems   No resolved problems to display.        Hospital Course     Mr. Mae is a 77 y.o. male former smoker with a history of CAD/CABG, hydrocephalus s/p VPS placement, HTN, HLD, KLEBER, RBBB, impaired memory who presented to [facility] initially complaining of weakness, impaired gait, incontinence.  Please see the admitting history and physical for further details. CTH head was completed. MICHAEL was consulted. There was no need for  shunt adjustment. No evidence of infection or metabolic causes of worsening gait. He has been started on B12 replacement for low normal level. He was monitored for alcohol withdrawal, he remained without symptoms. OT/PT evaluated recommending home health services. Medically he is stable for discharge.  Day of Discharge     Subjective:  No new issues overnight. Denies pain. Appetite wnl.     Physical Exam:  Temp:  [97.5 °F (36.4 °C)-97.7 °F (36.5 °C)] 97.5 °F (36.4 °C)  Heart Rate:  [50-60] 60  Resp:  [16] 16  BP: (107-142)/(50-66) 117/66  Body mass index is 36.56 kg/m².  Physical Exam  Vitals and nursing note reviewed.    Constitutional:       General: He is not in acute distress.     Appearance: He is obese. He is ill-appearing.      Comments: Chronically ill appearance   HENT:      Head: Normocephalic.      Mouth/Throat:      Mouth: Mucous membranes are moist.   Eyes:      Conjunctiva/sclera: Conjunctivae normal.   Cardiovascular:      Rate and Rhythm: Normal rate and regular rhythm.   Pulmonary:      Effort: Pulmonary effort is normal. No respiratory distress.      Breath sounds: No wheezing or rales.   Abdominal:      General: Bowel sounds are normal.      Palpations: Abdomen is soft.   Musculoskeletal:      Cervical back: Neck supple.      Right lower leg: No edema.      Left lower leg: No edema.   Skin:     General: Skin is warm and dry.   Neurological:      Mental Status: He is alert. Mental status is at baseline.   Psychiatric:         Cognition and Memory: Memory is impaired.         Consultants     Consult Orders (all) (From admission, onward)     Start     Ordered    03/14/23 0513  Inpatient Neurosurgery Consult  Once        Specialty:  Neurosurgery  Provider:  Aquiles Pearce MD    03/14/23 0513    03/13/23 2313  [facility] (on-call MD unless specified) Details  Once        Specialty:  Hospitalist  Provider:  Roverto Purdy MD    03/13/23 2312              Procedures     * Surgery not found *      Imaging Results (All)     Procedure Component Value Units Date/Time    CT Head Without Contrast [[id number]] Collected: 03/13/23 2003     Updated: 03/13/23 2015    Narrative:      CT HEAD     HISTORY:Dizziness, trouble walking, history of  shunt     TECHNIQUE: CT scan of the head was obtained with 3 mm axial images  without intravenous contrast.  Radiation dose reduction techniques were  utilized, including automated exposure control and exposure modulation  based on body size.     COMPARISON:Multiple head CTs dating back to 05/19/2022     FINDINGS:  There is no finding of acute infarct, hemorrhage, contusion or  abnormal  extra-axial collection. A right posterior parietal approach  ventriculostomy catheter is present with tip terminating over the left  lateral ventricle, grossly unchanged in overall configuration since  07/13/2022. The degree of dilation of the lateral and third ventricles  is grossly unchanged since 07/13/2022.       Impression:      1.  No noncontrast CT findings of acute intracranial abnormality. Right  posterior parietal approach ventriculostomy catheter tip terminating  over the left lateral ventricle, as before. The degree of dilation of  the lateral and third ventricles is grossly unchanged since 07/13/2022.        This report was finalized on 3/13/2023 8:12 PM by Dr. Nick Chakraborty M.D.             Results for orders placed during the hospital encounter of 10/28/21    Adult Transthoracic Echo Complete W/ Cont if Necessary Per Protocol    Interpretation Summary  · Calculated left ventricular EF = 66% Estimated left ventricular EF was in agreement with the calculated left ventricular EF. Left ventricular systolic function is normal.  · Left ventricular diastolic function was normal.  · Estimated right ventricular systolic pressure from tricuspid regurgitation is normal (<35 mmHg).  · Sclerotic aortic valve noted without any significant stenosis or regurgitation.    Pertinent Labs     Results from last 7 days   Lab Units 03/15/23  0417 03/14/23  0616 03/13/23  1855   WBC 10*3/mm3 7.72 7.17 6.55   HEMOGLOBIN g/dL 13.5 13.0 14.0   PLATELETS 10*3/mm3 157 149 167     Results from last 7 days   Lab Units 03/15/23  0417 03/14/23  0617 03/13/23  1855   SODIUM mmol/L 144 143 142   POTASSIUM mmol/L 3.7 3.7 4.1   CHLORIDE mmol/L 108* 108* 104   CO2 mmol/L 23.0 24.3 23.7   BUN mg/dL 28* 22 23   CREATININE mg/dL 1.46* 1.29* 1.46*   GLUCOSE mg/dL 104* 111* 123*   EGFR mL/min/1.73 48.6* 56.4* 48.6*     Results from last 7 days   Lab Units 03/13/23  1855   ALBUMIN g/dL 4.4   BILIRUBIN mg/dL 0.4   ALK PHOS U/L 80    AST (SGOT) U/L 22   ALT (SGPT) U/L 45*     Results from last 7 days   Lab Units 03/15/23  0417 03/14/23  0617 03/13/23  1855   CALCIUM mg/dL 9.0 9.5 9.5   ALBUMIN g/dL  --   --  4.4       Results from last 7 days   Lab Units 03/13/23  1855   HSTROP T ng/L 12           Invalid input(s): LDLCALC          Test Results Pending at Discharge       Discharge Details        Discharge Medications      New Medications      Instructions Start Date   cyanocobalamin 1000 MCG tablet  Commonly known as: VITAMIN B-12   1,000 mcg, Oral, Daily   Start Date: March 18, 2023        Continue These Medications      Instructions Start Date   alfuzosin 10 MG 24 hr tablet  Commonly known as: UROXATRAL   10 mg, Oral, Every Night at Bedtime      amLODIPine 5 MG tablet  Commonly known as: NORVASC   1 tablet, Oral, Daily, For blood pressure      donepezil 10 MG tablet  Commonly known as: ARICEPT   10 mg, Oral, Nightly      finasteride 5 MG tablet  Commonly known as: PROSCAR   5 mg, Oral, Daily      memantine 28 MG capsule sustained-release 24 hr extended release capsule  Commonly known as: NAMENDA XR   Oral, Daily      rosuvastatin 10 MG tablet  Commonly known as: CRESTOR   10 mg, Oral, Daily      tadalafil 20 MG tablet tablet   20 mg, Oral, Every 24 Hours Scheduled      traZODone 50 MG tablet  Commonly known as: DESYREL   50 mg, Oral, Nightly      Trintellix 20 MG tablet  Generic drug: Vortioxetine HBr   20 mg, Oral, Daily             No Known Allergies    Discharge Disposition:  Home-Health Care [facility]      Discharge Diet:  Diet Order   Procedures   • Diet: Cardiac Diets; Healthy Heart (2-3 Na+); Texture: Regular Texture (IDDSI 7); Fluid Consistency: Thin (IDDSI 0)       Discharge Activity:   Activity Instructions     Activity as Tolerated            CODE STATUS:    Code Status and Medical Interventions:   Ordered at: 03/14/23 0513     Code Status (Patient has no pulse and is not breathing):    CPR (Attempt to Resuscitate)     Medical  Interventions (Patient has pulse or is breathing):    Full Support       No future appointments.  Additional Instructions for the Follow-ups that You Need to Schedule     Ambulatory Referral to Home Health ([facility])   As directed      Face to Face Visit Date: 3/16/2023    Follow-up provider for Plan of Care?: I treated the patient in an acute care facility and will not continue treatment after discharge.    Follow-up provider: RYAN STEPHEN [[id number]]    Reason/Clinical Findings: Impaired gait, weakness    Describe mobility limitations that make leaving home difficult: Decreased strength, endurance, mobility    Nursing/Therapeutic Services Requested: Physical Therapy Occupational Therapy    PT orders: Therapeutic exercise Gait Training Strengthening Home safety assessment    Weight Bearing Status: As Tolerated    Occupational orders: Activities of daily living Strengthening Cognition Home safety assessment    Frequency: 1 Week 1            Contact information for follow-up providers     Ryan Stephen, APRN. Schedule an appointment as soon as possible for a visit in 1 week(s).    Specialty: Nurse Practitioner  Contact information:  [address] [facility] [address]  [place] [id number] [phone]             Aquiles Pearce MD Follow up.    Specialty: Neurosurgery  Why: As needed  Contact information:  [address]  [facility] [address]  [place] [address]  [phone]                   Contact information for after-discharge care     [facility] [facility]    Service: Home Health Services  Contact information:  [address]  [facility] [address] [phone]                             Additional Instructions for the Follow-ups that You Need to Schedule     Ambulatory Referral to Home Health ([facility])   As directed      Face to Face Visit Date: 3/16/2023    Follow-up provider for Plan of Care?: I treated the patient in an acute care facility and will not  continue treatment after discharge.    Follow-up provider: SUHA CHAVARRIA [[id number]]    Reason/Clinical Findings: Impaired gait, weakness    Describe mobility limitations that make leaving home difficult: Decreased strength, endurance, mobility    Nursing/Therapeutic Services Requested: Physical Therapy Occupational Therapy    PT orders: Therapeutic exercise Gait Training Strengthening Home safety assessment    Weight Bearing Status: As Tolerated    Occupational orders: Activities of daily living Strengthening Cognition Home safety assessment    Frequency: 1 Week 1           Time Spent on Discharge:  Greater than 30 minutes      FRANSISCO Cavanaugh  [facility]  03/16/23  15:45 EDT

## 2023-03-16 NOTE — DISCHARGE PLACEMENT REQUEST
"Sharif Pepe (77 y.o. Male)     Date of Birth   1945    Social Security Number       Address   [address] [place] [address]    Home Phone   [phone]    MRN   [id number]       Confucianist   Taoist    Marital Status                               Admission Date   3/13/23    Admission Type   Emergency    Admitting Provider   Kameron Benito MD    Attending Provider   Kameron Benito MD    Department, Room/Bed   [address], [address]/1       Discharge Date       Discharge Disposition   Home-Health Care [facility]    Discharge Destination                               Attending Provider: Kameron Benito MD    Allergies: No Known Allergies    Isolation: None   Infection: None   Code Status: CPR    Ht: 170.2 cm (67.01\")   Wt: 106 kg (233 lb 7.5 oz)    Admission Cmt: None   Principal Problem: Generalized weakness [R53.1]                 Active Insurance as of 3/13/2023     Primary Coverage     Payor Plan Insurance Group Employer/Plan Group    [facility] MEDICARE REPLACEMENT [facility] MEDICARE REPLACEMENT [id number]     Payor Plan Address Payor Plan Phone Number Payor Plan Fax Number Effective Dates    [address] [address]   1/1/2016 - None Entered    [facility] [id number]       Subscriber Name Subscriber Birth Date Member ID       SHARIF PEPE 1945 [id number]                 Emergency Contacts      (Rel.) Home Phone Work Phone Mobile Phone    Jennifer Pepe (Spouse) [phone] -- --              "

## 2023-11-16 ENCOUNTER — TELEPHONE (OUTPATIENT)
Dept: NEUROSURGERY | Facility: CLINIC | Age: 78
End: 2023-11-16
Payer: MEDICARE

## 2023-11-16 NOTE — TELEPHONE ENCOUNTER
Patient's wife called patient is needing clearance to have surgery on Dec 6th. They need to make sure his shunt ok. Patient's wife is needing a call back.

## 2023-11-20 NOTE — TELEPHONE ENCOUNTER
LM for patient asking to call the office and ask for me to find out what type of surgery he is having on 12/06/2023

## 2023-11-21 ENCOUNTER — OFFICE VISIT (OUTPATIENT)
Dept: CARDIOLOGY | Facility: CLINIC | Age: 78
End: 2023-11-21
Payer: MEDICARE

## 2023-11-21 VITALS
HEIGHT: 67 IN | WEIGHT: 237 LBS | DIASTOLIC BLOOD PRESSURE: 60 MMHG | HEART RATE: 58 BPM | BODY MASS INDEX: 37.2 KG/M2 | SYSTOLIC BLOOD PRESSURE: 110 MMHG

## 2023-11-21 DIAGNOSIS — Z01.810 PRE-OPERATIVE CARDIOVASCULAR EXAMINATION: ICD-10-CM

## 2023-11-21 DIAGNOSIS — G47.33 OSA ON CPAP: ICD-10-CM

## 2023-11-21 DIAGNOSIS — E78.5 HYPERLIPIDEMIA, UNSPECIFIED HYPERLIPIDEMIA TYPE: ICD-10-CM

## 2023-11-21 DIAGNOSIS — I10 ESSENTIAL HYPERTENSION: ICD-10-CM

## 2023-11-21 DIAGNOSIS — I45.10 RBBB (RIGHT BUNDLE BRANCH BLOCK): ICD-10-CM

## 2023-11-21 DIAGNOSIS — I25.10 CORONARY ARTERY DISEASE INVOLVING NATIVE CORONARY ARTERY OF NATIVE HEART WITHOUT ANGINA PECTORIS: Primary | ICD-10-CM

## 2023-11-21 DIAGNOSIS — Z95.1 HX OF CABG: ICD-10-CM

## 2023-11-21 RX ORDER — BUPROPION HYDROCHLORIDE 150 MG/1
150 TABLET ORAL DAILY
COMMUNITY
Start: 2023-08-02

## 2023-11-21 NOTE — PROGRESS NOTES
CARDIOLOGY        Patient Name: Sharif Mae  :1945  Age: 78 y.o.  Primary Cardiologist: Breana Noe MD  Encounter Provider:  FRANSISCO Mart    Date of Service: 23      CHIEF COMPLAINT / REASON FOR OFFICE VISIT     Follow-up and Coronary Artery Disease      HISTORY OF PRESENT ILLNESS       HPI  Sharif Mae is a 78 y.o. male who presents today for preoperative cardiovascular risk assessment.  Patient last evaluated in clinic 2021    Pt has a  history significant for CAD with prior CABG KLEBER with CPAP, hydrocephalus with VPS, impaired memory, history of alcohol abuse.    In  patient had an abnormal stress test which was followed by cardiac catheterization.  This revealed multivessel coronary disease.  Patient had coronary bypass grafting in 2004 with LIMA to LAD, KITTY to second obtuse marginal branch, SVG to ramus, SVG to first obtuse marginal branch.    Patient has had multiple hospitalizations over the past year, none of which were cardiac related.  He has had adjustments to his  shunt and has also been diagnosed with B12 deficiency and vitamin deficiency.    Patient is scheduled for a cataract surgery scheduled for  by Dr. Penelope Beltran.  Patient presents today with his wife.  Reports that he has done well since the last assessment.  He does try to remain active and golfs and does chair exercises.  He also tries to walk on a regular basis to keep his lower extremity strength intact.  He is asymptomatic and denies chest pain, dyspnea at rest or with exertion, palpitations, lightheadedness, edema.  Generalized fatigue is at baseline for patient.    The following portions of the patient's history were reviewed and updated as appropriate: allergies, current medications, past family history, past medical history, past social history, past surgical history and problem list.      VITAL SIGNS     Visit Vitals  /60 (BP Location: Left arm, Patient  "Position: Sitting)   Pulse 58   Ht 170.2 cm (67\")   Wt 108 kg (237 lb)   BMI 37.12 kg/m²         Wt Readings from Last 3 Encounters:   11/21/23 108 kg (237 lb)   03/14/23 106 kg (233 lb 7.5 oz)   09/08/22 102 kg (224 lb 13.9 oz)     Body mass index is 37.12 kg/m².      REVIEW OF SYSTEMS   Review of Systems   Constitutional: Negative for chills, fever, weight gain and weight loss.   Cardiovascular:  Negative for leg swelling.   Respiratory:  Negative for cough, snoring and wheezing.    Hematologic/Lymphatic: Negative for bleeding problem. Does not bruise/bleed easily.   Skin:  Negative for color change.   Musculoskeletal:  Negative for falls, joint pain and myalgias.   Gastrointestinal:  Negative for melena.   Genitourinary:  Negative for hematuria.   Neurological:  Negative for excessive daytime sleepiness.   Psychiatric/Behavioral:  Negative for depression. The patient is not nervous/anxious.            PHYSICAL EXAMINATION     Constitutional:       Appearance: Normal appearance. Well-developed.   Eyes:      Conjunctiva/sclera: Conjunctivae normal.   Neck:      Vascular: No carotid bruit.   Pulmonary:      Effort: Pulmonary effort is normal.      Breath sounds: Normal breath sounds.   Cardiovascular:      Normal rate. Regular rhythm. Normal S1. Normal S2.       Murmurs: There is no murmur.      No gallop.  No click. No rub.   Edema:     Peripheral edema absent.   Musculoskeletal: Normal range of motion. Skin:     General: Skin is warm and dry.   Neurological:      Mental Status: Alert and oriented to person, place, and time.      GCS: GCS eye subscore is 4. GCS verbal subscore is 5. GCS motor subscore is 6.   Psychiatric:         Speech: Speech normal.         Behavior: Behavior normal.         Thought Content: Thought content normal.         Judgment: Judgment normal.           REVIEWED DATA       ECG 12 Lead    Date/Time: 11/21/2023 9:36 AM  Performed by: Kelsey Sethi APRN    Authorized by: Kelsey Sethi " A, APRN  Comparison: compared with previous ECG from 3/13/2023  Rhythm: sinus rhythm  Rate: normal  BPM: 58  Conduction: incomplete right bundle branch block  ST Segments: ST segments normal  T Waves: T waves normal  QRS axis: normal    Clinical impression: abnormal EKG          Cardiac Procedures:  CABG December 2004.  LIMA to LAD, KITTY to second obtuse marginal, SVG to ramus, SVG to first obtuse marginal.  Echocardiogram 10/28/2021.  LVEF 66%.  Diastolic function normal.  Estimated RVSP less than 35 mmHg.  Sclerotic aortic valve noted without any significant stenosis or regurgitation.  Myocardial perfusion stress test 10/28/2021.  Normal myocardial perfusion study without evidence of ischemia.  Enlarged RV cavity size.  Impressions consistent with a low risk study.        Lab Results   Component Value Date     03/15/2023     03/14/2023    K 3.7 03/15/2023    K 3.7 03/14/2023     (H) 03/15/2023     (H) 03/14/2023    CO2 23.0 03/15/2023    CO2 24.3 03/14/2023    BUN 28 (H) 03/15/2023    BUN 22 03/14/2023    CREATININE 1.46 (H) 03/15/2023    CREATININE 1.29 (H) 03/14/2023    EGFRIFNONA 57 (L) 08/10/2021    GLUCOSE 104 (H) 03/15/2023    GLUCOSE 111 (H) 03/14/2023    CALCIUM 9.0 03/15/2023    CALCIUM 9.5 03/14/2023    ALBUMIN 4.4 03/13/2023    ALBUMIN 4.30 08/10/2021    BILITOT 0.4 03/13/2023    BILITOT 0.3 08/10/2021    AST 22 03/13/2023    AST 25 08/10/2021    ALT 45 (H) 03/13/2023    ALT 37 08/10/2021     Lab Results   Component Value Date    WBC 7.72 03/15/2023    WBC 7.17 03/14/2023    HGB 13.5 03/15/2023    HGB 13.0 03/14/2023    HCT 39.3 03/15/2023    HCT 38.4 03/14/2023    MCV 99.7 (H) 03/15/2023    MCV 98.0 (H) 03/14/2023     03/15/2023     03/14/2023     Lab Results   Component Value Date    BNP 51 08/02/2023     Lab Results   Component Value Date    TROPONINT 12 03/13/2023     Lab Results   Component Value Date    TSH 1.220 08/10/2021             ASSESSMENT & PLAN      Diagnoses and all orders for this visit:    1. Coronary artery disease involving native coronary artery of native heart without angina pectoris (Primary)  Assessment & Plan:  History of CABG in 2004  Normal myocardial perfusion stress test in 2021  Continue rosuvastatin  Encouraged to restart baby aspirin    Orders:  -     ECG 12 Lead    2. Hx of CABG    3. Essential hypertension  Assessment & Plan:  BP controlled in 110/60  Continue amlodipine 5 mg/day      4. RBBB (right bundle branch block)    5. KLEBER on CPAP    6. Hyperlipidemia, unspecified hyperlipidemia type  Assessment & Plan:  Continue rosuvastatin  LDL controlled in the 80s      7. Pre-operative cardiovascular examination  Assessment & Plan:  Patient in need of a cataract surgery 12/6.  Patient is of low risk of major cardiovascular episode in the perioperative setting.  He is not on any anticoagulants or antiplatelets.  Patient had negative myocardial perfusion stress test in 2021.  Also had a unremarkable echocardiogram in 2021.            Return in about 1 year (around 11/21/2024) for Dr. Noe.    Future Appointments         Provider Department Center    11/21/2024 1:00 PM Breana Noe MD [facility] CARDIOLOGY [facility]                  MEDICATIONS         Discharge Medications            Accurate as of November 21, 2023 10:02 AM. If you have any questions, ask your nurse or doctor.                Continue These Medications        Instructions Start Date   alfuzosin 10 MG 24 hr tablet  Commonly known as: UROXATRAL   10 mg, Oral, Every Night at Bedtime      amLODIPine 5 MG tablet  Commonly known as: NORVASC   1 tablet, Oral, Daily, For blood pressure      buPROPion  MG 24 hr tablet  Commonly known as: WELLBUTRIN XL   150 mg, Daily      cyanocobalamin 1000 MCG tablet  Commonly known as: VITAMIN B-12   1,000 mcg, Oral, Daily      finasteride 5 MG tablet  Commonly known as: PROSCAR   5 mg, Oral, Daily      memantine 28 MG  capsule sustained-release 24 hr extended release capsule  Commonly known as: NAMENDA XR   Oral, Daily      rosuvastatin 10 MG tablet  Commonly known as: CRESTOR   10 mg, Oral, Daily      Trintellix 20 MG tablet  Generic drug: Vortioxetine HBr   20 mg, Oral, Daily                   **Dragon Disclaimer:   Much of this encounter note is an electronic transcription/translation of spoken language to printed text. The electronic translation of spoken language may permit erroneous, or at times, nonsensical words or phrases to be inadvertently transcribed. Although I have reviewed the note for such errors, some may still exist.

## 2023-11-21 NOTE — ASSESSMENT & PLAN NOTE
History of CABG in 2004  Normal myocardial perfusion stress test in 2021  Continue rosuvastatin  Encouraged to restart baby aspirin

## 2023-11-21 NOTE — ASSESSMENT & PLAN NOTE
Patient in need of a cataract surgery 12/6.  Patient is of low risk of major cardiovascular episode in the perioperative setting.  He is not on any anticoagulants or antiplatelets.  Patient had negative myocardial perfusion stress test in 2021.  Also had a unremarkable echocardiogram in 2021.

## (undated) DEVICE — 3M™ STERI-STRIP™ ANTIMICROBIAL SKIN CLOSURES 1/2 INCH X 4 INCHES 50/CARTON 4 CARTONS/CASE A1847: Brand: 3M™ STERI-STRIP™

## (undated) DEVICE — SCANLAN® SUTURE BOOT™ INSTRUMENT JAW COVERS - ORIGINAL YELLOW, STANDARD PKG (5 PAIR/CARTRIDGE, 1 CARTRIDGE/PKG): Brand: SCANLAN® SUTURE BOOT™ INSTRUMENT JAW COVERS

## (undated) DEVICE — PATIENT RETURN ELECTRODE, SINGLE-USE, CONTACT QUALITY MONITORING, ADULT, WITH 9FT CORD, FOR PATIENTS WEIGING OVER 33LBS. (15KG): Brand: MEGADYNE

## (undated) DEVICE — ENDOPATH XCEL BLADELESS TROCARS WITH STABILITY SLEEVES: Brand: ENDOPATH XCEL

## (undated) DEVICE — ENDOPATH PNEUMONEEDLE INSUFFLATION NEEDLES WITH LUER LOCK CONNECTORS 120MM: Brand: ENDOPATH

## (undated) DEVICE — LAPAROSCOPIC SMOKE ELIMINATION DEVICE: Brand: PNEUVIEW XE

## (undated) DEVICE — CONTAINER,SPECIMEN,OR STERILE,4OZ: Brand: MEDLINE

## (undated) DEVICE — TBG INSUFFLATION LUER LOCK: Brand: MEDLINE INDUSTRIES, INC.

## (undated) DEVICE — ADHS LIQ MASTISOL 2/3ML

## (undated) DEVICE — SMOKE EVACUATION TUBING WITH 7/8 IN TO 1/4 IN REDUCER: Brand: BUFFALO FILTER

## (undated) DEVICE — GLV SURG PREMIERPRO ORTHO LTX PF SZ7.5 BRN

## (undated) DEVICE — VISUALIZATION SYSTEM: Brand: CLEARIFY

## (undated) DEVICE — TOWEL,OR,DSP,ST,BLUE,STD,4/PK,20PK/CS: Brand: MEDLINE

## (undated) DEVICE — CONN TBG Y 5 IN 1 LF STRL

## (undated) DEVICE — GLV SURG SENSICARE W/ALOE PF LF 7.5 STRL

## (undated) DEVICE — DISPOSABLE IRRIGATION BIPOLAR CORD, M1000 TYPE: Brand: KIRWAN

## (undated) DEVICE — TRAP FLD MINIVAC MEGADYNE 100ML

## (undated) DEVICE — Device

## (undated) DEVICE — SUT MNCRYL PLS ANTIB UD 4/0 PS2 18IN

## (undated) DEVICE — PERFORATOR CDM WO/ DURAGUARD 14/11

## (undated) DEVICE — ENDOPATH XCEL UNIVERSAL TROCAR STABLILITY SLEEVES: Brand: ENDOPATH XCEL

## (undated) DEVICE — ANTIBACTERIAL UNDYED BRAIDED (POLYGLACTIN 910), SYNTHETIC ABSORBABLE SUTURE: Brand: COATED VICRYL

## (undated) DEVICE — DRSNG TELFA PAD NONADH STR 1S 3X4IN

## (undated) DEVICE — CODMAN® DISPOSABLE SPLIT TROCAR: Brand: CODMAN®

## (undated) DEVICE — SOL ISO/ALC RUB 70PCT 4OZ

## (undated) DEVICE — APPL CHLORAPREP HI/LITE 26ML ORNG

## (undated) DEVICE — STPCK 3WY D201 DISCOFIX

## (undated) DEVICE — SYR CONTRL LUERLOK 10CC

## (undated) DEVICE — PK NEURO SPINE 40

## (undated) DEVICE — TBG PENCL TELESCP MEGADYNE SMOKE EVAC 10FT

## (undated) DEVICE — SPONGE,NEURO,.75"X.75",XR,STRL,LF,10/PK: Brand: MEDLINE

## (undated) DEVICE — KT ANTI FOG W/FLD AND SPNG

## (undated) DEVICE — SOL IRR PHYSIOSOL BTL 1000ML

## (undated) DEVICE — INTENDED FOR TISSUE SEPARATION, AND OTHER PROCEDURES THAT REQUIRE A SHARP SURGICAL BLADE TO PUNCTURE OR CUT.: Brand: BARD-PARKER ® STAINLESS STEEL BLADES

## (undated) DEVICE — BLUNT CANNULA: Brand: MONOJECT

## (undated) DEVICE — NDL HYPO PRECISIONGLIDE REG 20G 1 1/2

## (undated) DEVICE — GLV SURG BIOGEL LTX PF 7

## (undated) DEVICE — 3M™ IOBAN™ 2 ANTIMICROBIAL INCISE DRAPE 6650EZ: Brand: IOBAN™ 2